# Patient Record
(demographics unavailable — no encounter records)

---

## 2024-10-15 NOTE — HISTORY OF PRESENT ILLNESS
[FreeTextEntry1] : Mr. Doran is an 83 y/o male with hx of HTN, MI (1986) s/p 2v CABG (2015), Lung cancer s/p Left and right VATS 12/2023 on immunotherapy, DMT2, and cholecystitis who presents to IR today for consultation for cholecystostomy tube and cholangiogram.  Pt was initially consulted on 8/8/24 & 8/14/24 with Dr La for spyglass cholangioscopy  - cystic duct stone extraction was unsuccessful. Stephany tube dislodged - had IR tube check on 10/9 - technically unable to be replace after multiple attempts. He is planned for Robotic assisted Laparoscopic cholecystectomy, intraoperative cholangiogram, possible open with Dr Zhang now that the pt has been cleared by his cardiologist.  Today, Mr Doran reports he will be having a consult with Dr Blum, stating that Dr Huertas referred him to this surgeon from [facility] - appt is on 10/18. Mr Doran denies fever, chills, abd pain, n/v/d.  I discussed that given he is asymptomatic, will hold off with stephany tube placement. Should he develop above s/s, he is to be taken to the ED / call 911.  He is currently booked with Dr Zhang at [place] on 10/25.    Cholecystitis Hx: Pt initially was hospitalized for cholecystitis and underwent IR percutaneous cholecystostomy tube placement on 04/05/2024. He was planned for Robotic assisted Laparoscopic cholecystectomy on 6/24/24 with Dr. Zhang.  He was advised to have cardiac clearance.  He had evaluation with his cardiologist, Dr. Huertas, but was advised to f/u with IR to determine if he is a candidate for spyglass cholangioscopy/lithotripsy in lieu of surgery.   Since drain insertion, he has not had a drain evaluation or exchange.  He has not been flushing drain and have been changing the dressing 1-2x/week.  Reports daily output of ~ 100ml. Denies fever, chills, abdominal pain.  Eating regular diet.   He completed chemotherapy for lung cancer and is now on immunotherapy.  Feels well, regaining some of the weight he lost while on chemo (previously 152lbs, now 165lbs)  Today he presents s/p stephany tube check on 8/12/24. Tube check demonstrated filling of a decompressed gallbladder cavity with persistent obstruction of the cystic duct. Pt returns today to discuss spyglass procedure. He reports continues feeling well.   Surg: Carlos Zhang - [place] Cardiac: Garry Huertas [phone] Surg: Dr Kerr - [facility] [0] : ~His/Her~ pain was 0 out of 10

## 2024-10-15 NOTE — ASSESSMENT
[FreeTextEntry1] : Mr. Diaz is an 85 y/o male with hx of HTN, MI (1986) s/p 2v CABG (2015), Lung cancer s/p Left and right VATS 12/2023 on immunotherapy, DMT2, and cholecystitis who presents to IR today for consultation for cholecystostomy tube and cholangiogram.  # Cholecystitis, Cholelithiasis - pt with admission for cholecystitis in 4/2024 s/p cholecystostomy tube - previously planned for robotic assisted Laparoscopic cholecystectomy on 6/24/24 - pt was initially consulted on 8/8/24 & 8/14/24 with Dr La for spyglass cholangioscopy  - cystic duct stone extraction was unsuccessful -  pt had stephany tube check on 10/9 - tube was removed & was unable to be replaced after multiple attempts - he was seen by Dr Zhang & is planned for Robotic assisted Laparoscopic cholecystectomy, intraoperative cholangiogram, possible open with Dr Zhang given the pt has been cleared by his cardiologist - booked for 10/25 - Mr Diaz reports he will be having a consult with Dr Blum stating that Dr Huertas referred him to this surgeon from [facility] - appt is on 10/18 - he denies fever, chills, abd pain, n/v/d - I discussed that given he is asymptomatic, will hold off with stephany tube placement - should he develop above s/s, he is to be taken to the ED / call 911 & call our dept & speak to the IR on call resident if after hours - he states that he is tentatively booked with Dr Zhang at [place] on 10/25   & Mrs. DIAZ's comprehension was confirmed & all questions were asked & answered to their satisfaction. IR contact information was reviewed with the pt should there be any questions, issues, or concerns, to be addressed.

## 2024-10-15 NOTE — HISTORY OF PRESENT ILLNESS
[de-identified] : JERRY DIAZ is a 84 year old male with PMHx of lung cancer on chemotherapy (last round 3/19), s/p R wedge resection in 2023, CAD s/p CABG in 2015 on ASA, DMT2 who presents in the office for preop visit for Laparoscopic cholecystectomy. Patient percutaneous cholecystostomy tube check 8/12 demonstrating filling of a decompressed gallbladder cavity with persistent obstruction of the cystic duct, he underwent a stephany tube check on 10/09/2024 tube was removed and unable to replace, multiple attempts made by IR. Patient called the office stated that his cardiologist cleared him for Laparoscopic cholecystectomy.  Today patient is doing well, offers no complaints, he will need medical and cardiology clearance prior the surgery.

## 2024-10-15 NOTE — ADDENDUM
[FreeTextEntry1] :  I spent 35  minutes reviewing the patient's chart, labs, imaging, interviewing and examining patient, and discussing plan of care with the patient, and other providers, excluding teaching and separately reported services and separately billable procedures.

## 2024-10-15 NOTE — DATA REVIEWED
[FreeTextEntry1] : ACC: [id number] EXAM: IR PROCEDURE NON PICC ORDERED BY: MARIA ISABEL FRANCISCO  PROCEDURE DATE: 09/12/2024  INTERPRETATION: Procedure: Cholangioscopy and cholecystostomy tube check/exchange.  Clinical Information: 84-year-old with history of cholecystitis status post cholecystostomy tube placement. Cholangioscopy is requested.  Technique: Informed consent obtained. The patient was placed in the supine position. The upper abdomen was prepped and draped in the usual sterile fashion. Timeout was performed. 1% lidocaine used for local anesthesia.  Contrast injection demonstrated gallbladder with obstructed cystic duct. There are no filling defects to suggest gallstones however there is presumption of large stone in the mid cystic duct. Cholecystostomy tube was then exchanged over wire for a 12 [place] Zealander sheath. Through the 12 [place] sheath a cholangioscope was placed. Multiple attempts are made to cannulate the cystic duct unsuccessfully. Multiple attempts are made with a catheter and wire to access the cystic duct which were unsuccessful. The sheath was removed and a new 10 [place] Zealander cholecystostomy tube catheter was placed.  Sedation: None.  Impression:  Unsuccessful cystic duct stone removal.  --- End of Report ---   MARIA ISABEL FRANCISCO MD; Attending Radiologist This document has been electronically signed. Sep 25 2024 1:36PM

## 2024-10-15 NOTE — ASSESSMENT
[FreeTextEntry1] : 84 year M with gallstones. Recommend laparoscopic cholecystectomy with intra-operative cholangiogram to avoid complications of gallstones. Risks, benefits, alternatives discussed. Risks discussed include: bleeding, bile duct injury, accidental injury to adjacent structures, cystic duct leak, need to do open procedure, need to do subtotal cholecystectomy, infection. All questions answered. He agrees to proceed.  We recommend Robotic assisted Laparoscopic cholecystectomy, intraoperative cholangiogram, possible open, he agrees to proceed.

## 2024-10-15 NOTE — PHYSICAL EXAM
[Normal Breath Sounds] : Normal breath sounds [Normal Heart Sounds] : normal heart sounds [Normal Rate and Rhythm] : normal rate and rhythm [No Rash or Lesion] : No rash or lesion [Alert] : alert [Oriented to Person] : oriented to person [Oriented to Place] : oriented to place [Oriented to Time] : oriented to time [Calm] : calm [de-identified] : The patient is alert, well-groomed  [de-identified] : Normoactive bowel sounds, soft and nontender, no hepatosplenomegaly or masses noted [de-identified] : full range of motion and no deformities appreciated.

## 2024-10-15 NOTE — HISTORY OF PRESENT ILLNESS
[FreeTextEntry1] : Mr. Doran is an 83 y/o male with hx of HTN, MI (1986) s/p 2v CABG (2015), Lung cancer s/p Left and right VATS 12/2023 on immunotherapy, DMT2, and cholecystitis who presents to IR today for consultation for cholecystostomy tube and cholangiogram.  Pt was initially consulted on 8/8/24 & 8/14/24 with Dr La for spyglass cholangioscopy  - cystic duct stone extraction was unsuccessful. Stephany tube dislodged - had IR tube check on 10/9 - technically unable to be replace after multiple attempts. He is planned for Robotic assisted Laparoscopic cholecystectomy, intraoperative cholangiogram, possible open with Dr Zhang now that the pt has been cleared by his cardiologist.  Today, Mr Doran reports he will be having a consult with Dr Blum, stating that Dr Huertas referred him to this surgeon from [facility] - appt is on 10/18. Mr Doran denies fever, chills, abd pain, n/v/d.  I discussed that given he is asymptomatic, will hold off with stephany tube placement. Should he develop above s/s, he is to be taken to the ED / call 911.  He is currently booked with Dr Zhang at [place] on 10/25.    Cholecystitis Hx: Pt initially was hospitalized for cholecystitis and underwent IR percutaneous cholecystostomy tube placement on 04/05/2024. He was planned for Robotic assisted Laparoscopic cholecystectomy on 6/24/24 with Dr. Zhang.  He was advised to have cardiac clearance.  He had evaluation with his cardiologist, Dr. Huertas, but was advised to f/u with IR to determine if he is a candidate for spyglass cholangioscopy/lithotripsy in lieu of surgery.   Since drain insertion, he has not had a drain evaluation or exchange.  He has not been flushing drain and have been changing the dressing 1-2x/week.  Reports daily output of ~ 100ml. Denies fever, chills, abdominal pain.  Eating regular diet.   He completed chemotherapy for lung cancer and is now on immunotherapy.  Feels well, regaining some of the weight he lost while on chemo (previously 152lbs, now 165lbs)  Today he presents s/p stephany tube check on 8/12/24. Tube check demonstrated filling of a decompressed gallbladder cavity with persistent obstruction of the cystic duct. Pt returns today to discuss spyglass procedure. He reports continues feeling well.   Surg: Carlos Zhang - [place] Cardiac: Garry Huertas [phone] Surg: Dr Kerr - [facility] [0] : ~His/Her~ pain was 0 out of 10

## 2024-10-15 NOTE — ASSESSMENT
[FreeTextEntry1] : Mr. Diaz is an 85 y/o male with hx of HTN, MI (1986) s/p 2v CABG (2015), Lung cancer s/p Left and right VATS 12/2023 on immunotherapy, DMT2, and cholecystitis who presents to IR today for consultation for cholecystostomy tube and cholangiogram.  # Cholecystitis, Cholelithiasis - pt with admission for cholecystitis in 4/2024 s/p cholecystostomy tube - previously planned for robotic assisted Laparoscopic cholecystectomy on 6/24/24 - pt was initially consulted on 8/8/24 & 8/14/24 with Dr La for spyglass cholangioscopy  - cystic duct stone extraction was unsuccessful -  pt had stephany tube check on 10/9 - tube was removed & was unable to be replaced after multiple attempts - he was seen by Dr Zhang & is planned for Robotic assisted Laparoscopic cholecystectomy, intraoperative cholangiogram, possible open with Dr Zhang given the pt has been cleared by his cardiologist - booked for 10/25 - Mr Diaz reports he will be having a consult with Dr Blum stating that Dr Huertas referred him to this surgeon from [facility] - appt is on 10/18 - he denies fever, chills, abd pain, n/v/d - I discussed that given he is asymptomatic, will hold off with stephany tube placement - should he develop above s/s, he is to be taken to the ED / call 911 & call our dept & speak to the IR on call resident if after hours - he states that he is tentatively booked with Dr Zhang at [place] on 10/25   & Mrs. DIAZ's comprehension was confirmed & all questions were asked & answered to their satisfaction. IR contact information was reviewed with the pt should there be any questions, issues, or concerns, to be addressed.

## 2024-10-15 NOTE — PHYSICAL EXAM
[Normal Breath Sounds] : Normal breath sounds [Normal Heart Sounds] : normal heart sounds [Normal Rate and Rhythm] : normal rate and rhythm [No Rash or Lesion] : No rash or lesion [Alert] : alert [Oriented to Person] : oriented to person [Oriented to Place] : oriented to place [Oriented to Time] : oriented to time [Calm] : calm [de-identified] : The patient is alert, well-groomed  [de-identified] : Normoactive bowel sounds, soft and nontender, no hepatosplenomegaly or masses noted [de-identified] : full range of motion and no deformities appreciated.

## 2024-10-15 NOTE — DATA REVIEWED
[FreeTextEntry1] : ACC: [id number] EXAM: IR PROCEDURE NON PICC ORDERED BY: MARIA ISABEL FRANCISCO  PROCEDURE DATE: 09/12/2024  INTERPRETATION: Procedure: Cholangioscopy and cholecystostomy tube check/exchange.  Clinical Information: 84-year-old with history of cholecystitis status post cholecystostomy tube placement. Cholangioscopy is requested.  Technique: Informed consent obtained. The patient was placed in the supine position. The upper abdomen was prepped and draped in the usual sterile fashion. Timeout was performed. 1% lidocaine used for local anesthesia.  Contrast injection demonstrated gallbladder with obstructed cystic duct. There are no filling defects to suggest gallstones however there is presumption of large stone in the mid cystic duct. Cholecystostomy tube was then exchanged over wire for a 12 Macedonian sheath. Through the 12 Macedonian sheath a cholangioscope was placed. Multiple attempts are made to cannulate the cystic duct unsuccessfully. Multiple attempts are made with a catheter and wire to access the cystic duct which were unsuccessful. The sheath was removed and a new 10 Macedonian cholecystostomy tube catheter was placed.  Sedation: None.  Impression:  Unsuccessful cystic duct stone removal.  --- End of Report ---   MARIA ISABEL FRANCISCO MD; Attending Radiologist This document has been electronically signed. Sep 25 2024 1:36PM

## 2024-10-15 NOTE — PLAN
[FreeTextEntry1] : Mr. JERRY DIAZ Patient was told significance of findings, options, risks and benefits were explained. Informed consent for Robotic assisted Laparoscopic cholecystectomy, intraoperative cholangiogram, possible open   and potential risks, benefits and alternatives (surgical options were discussed including non-surgical options or the option of no surgery) to the planned surgery were discussed in depth. All surgical options were discussed including non-surgical treatments. The patient wishes to proceed with surgery. We will plan for surgery on at the next available date, pending any required insurance pre-certification or pre-approval. Patient agrees to obtain any necessary pre-operative evaluations and testing prior to surgery. Patient advised to seek immediate medical attention with any acute change in symptoms or with the development of any new or worsening symptoms. Patient's questions and concerns addressed to patient's satisfaction, and patient verbalized an understanding of the information discussed.  Will schedule for the surgery at [facility].  [facility] Clear[facility]

## 2024-10-15 NOTE — PLAN
[FreeTextEntry1] : Mr. JERRY DIAZ Patient was told significance of findings, options, risks and benefits were explained. Informed consent for Robotic assisted Laparoscopic cholecystectomy, intraoperative cholangiogram, possible open   and potential risks, benefits and alternatives (surgical options were discussed including non-surgical options or the option of no surgery) to the planned surgery were discussed in depth. All surgical options were discussed including non-surgical treatments. The patient wishes to proceed with surgery. We will plan for surgery on at the next available date, pending any required insurance pre-certification or pre-approval. Patient agrees to obtain any necessary pre-operative evaluations and testing prior to surgery. Patient advised to seek immediate medical attention with any acute change in symptoms or with the development of any new or worsening symptoms. Patient's questions and concerns addressed to patient's satisfaction, and patient verbalized an understanding of the information discussed.  Will schedule for the surgery at [facility].  [facility] Clearance

## 2024-10-15 NOTE — HISTORY OF PRESENT ILLNESS
[de-identified] : JERRY DIAZ is a 84 year old male with PMHx of lung cancer on chemotherapy (last round 3/19), s/p R wedge resection in 2023, CAD s/p CABG in 2015 on ASA, DMT2 who presents in the office for preop visit for Laparoscopic cholecystectomy. Patient percutaneous cholecystostomy tube check 8/12 demonstrating filling of a decompressed gallbladder cavity with persistent obstruction of the cystic duct, he underwent a stephany tube check on 10/09/2024 tube was removed and unable to replace, multiple attempts made by IR. Patient called the office stated that his cardiologist cleared him for Laparoscopic cholecystectomy.  Today patient is doing well, offers no complaints, he will need medical and cardiology clearance prior the surgery.

## 2025-01-30 NOTE — PHYSICAL EXAM
[] : no respiratory distress [Auscultation Breath Sounds / Voice Sounds] : lungs were clear to auscultation bilaterally [Heart Rate And Rhythm] : heart rate was normal and rhythm regular [Heart Sounds] : normal S1 and S2 [Examination Of The Chest] : the chest was normal in appearance [Chest Visual Inspection Thoracic Asymmetry] : no chest asymmetry [Diminished Respiratory Excursion] : normal chest expansion

## 2025-01-31 NOTE — ASSESSMENT
[FreeTextEntry1] : Mr. JERRY DIAZ, 85 year old male, former heavy smoker, w/ hx of HTN, MI in 1986, CABG (Lt internal mammary artery to Lt anterior descending artery) on 3/6/2015 by Dr. Jonny Alex, who presented to PCP for annual physical exam, sent for CXR due to tobacco use, found lung nodule.  Now 3 yrs 9mo s/p Lt VATS Robotic-assisted, MAURICIO apicoposterior segmentectomy, MLND on 10/7/2020. Path revealed AdenoCA, solid predominant, 1.5cm, G3, +WILLIAM, margins and (0/10) LNs negative, pT1bN0 Stg IA2.  Now >4 yrs s/p Right VATS, Robotic-assisted, wedge resection of RLL nodule, wedge resection of RUL nodule, MLND on 11/30/2020. 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 Path of RUL wedge resection revealed invasive papillary carcinoma, G2, + WILLIAM, 0.9 cm, pT1a(m)N0, Stage IA1. Two primary tumors.  Now 1 yr s/p Right VATS, robotic-assisted. Wedge resection of right middle lobe lung nodule.  Right middle lobectomy.  Mediastinal lymph node dissection on 12/11/23. Path revealed RML invasive papillary AdenoCA, 1.1 cm, G2, + Lvl 11R (1/6), all margins are negative, [id number], Stg IIB **IHC in the tumor (TTF1 positive and PAX8 negative) and lymph node (TTF1 positive and CDX2 negative) support the diagnosis of a primary lung adenocarcinoma with metastasis to the lymph node. ***The current tumor was compared to the right upper lobe tumor removed in 2020 ([id number]). Although both tumors are papillary predominant the histomorphology of both tumors are different and therefore considered separate primary tumors  Patient to f/u with Hem/Onc Dr. Aviles for chemo 8 cycles of chemo in June 2024 completed in May 2024, now on immunotherapy for 1 yr.   RUL increased in size, however, pt is on immunotherapy now, and had 3 surgeries in the past, Will continue observation. RTC in 6 mons with CT Chest w/o contrast  CT Chest on 1/21/25: - post-op changes - redemonstration of left paramediastinal atelectasis and airway associated groundglass opacities in left upper lobe - there are tubular branching and cluster of nodules in bilateral lungs, likely impacted airways (for instance in right lower lobe on series 4, image 619).  - there is an ill-defined 2.2 cm focus of groundglass opacity in right upper lobe, relatively stable - there are additional smaller foci of groundglass opacities in right upper lobe as follow: 7 mm focus of groundglass opacity in right upper lobe on series 4, image 485, with slight increase in size (previously measured 6 mm) 7 mm focus on groundglass opacity in right upper lobe on series 4, image 208, unchanged - A 1.8 cm right renal cyst is noted - there is 4 mm focus of a sclerosis in T7 vertebral body, unchanged and likely a bony island  I have reviewed the patient's medical records and diagnostic images at time of this office consultation and have made the following recommendation: 1. CT chest and PET scans reviewed and explained to patient. Results showed slow growing lesions relatively stable. I recommend continuing to monitor with CT Chest no contrast in 6 months. Patient in agreement. I personally spoke to Dr Aviles (Onc), will defer to oncology for further plan in regards to immunotherapy.  I, BILL Ramos, personally performed the evaluation and management (E/M) services for this established patient who presents today with (a) new problem(s)/exacerbation of (an) existing condition(s).  That E/M includes conducting the examination, assessing all new/exacerbated conditions, and establishing a new plan of care.  Today, my ACP, Racquel Escalante, KEDAR-BC was here to observe my evaluation and management services for this new problem/exacerbated condition to be followed going forward.

## 2025-01-31 NOTE — CONSULT LETTER
[Dear  ___] : Dear  [unfilled], [Consult Letter:] : I had the pleasure of evaluating your patient, [unfilled]. [( Thank you for referring [unfilled] for consultation for _____ )] : Thank you for referring [unfilled] for consultation for [unfilled] [Please see my note below.] : Please see my note below. [Consult Closing:] : Thank you very much for allowing me to participate in the care of this patient.  If you have any questions, please do not hesitate to contact me. [Sincerely,] : Sincerely, [DrJaya  ___] : Dr. CHAPARRO [DrJaya ___] : Dr. CHAPARRO [FreeTextEntry2] : Garry Davis MD (Cardiologist/Referring) Valeriy Garcia MD (Pulm) Gisel Hall MD (PCP) Efrain Clemente MD (GI)   [FreeTextEntry3] : Miky Ogden MD, MPH \par  System Director of Thoracic Surgery \par  Director of Comprehensive Lung and Foregut [place] \par  Professor Cardiovascular & Thoracic Surgery  \par  [facility] [employer] at [facility]\par

## 2025-01-31 NOTE — DATA REVIEWED
[FreeTextEntry1] : I have independently reviewed the following: CT Chest on 1/21/25: PET/CT 10/30/2024:

## 2025-01-31 NOTE — CONSULT LETTER
[Dear  ___] : Dear  [unfilled], [( Thank you for referring [unfilled] for consultation for _____ )] : Thank you for referring [unfilled] for consultation for [unfilled] [Consult Letter:] : I had the pleasure of evaluating your patient, [unfilled]. [Please see my note below.] : Please see my note below. [Consult Closing:] : Thank you very much for allowing me to participate in the care of this patient.  If you have any questions, please do not hesitate to contact me. [Sincerely,] : Sincerely, [DrJaya  ___] : Dr. CHAPARRO [DrJaya ___] : Dr. CHAPARRO [FreeTextEntry2] : Garry Davis MD (Cardiologist/Referring) Valeriy Garcia MD (Pulm) Gisel Hall MD (PCP) Efrain Clemente MD (GI)   [FreeTextEntry3] : Miky Ogden MD, MPH \par  System Director of Thoracic Surgery \par  Director of Comprehensive Lung and Foregut [place] \par  Professor Cardiovascular & Thoracic Surgery  \par  [facility] [employer] at [facility]\par

## 2025-01-31 NOTE — CONSULT LETTER
[Dear  ___] : Dear  [unfilled], [Consult Letter:] : I had the pleasure of evaluating your patient, [unfilled]. [( Thank you for referring [unfilled] for consultation for _____ )] : Thank you for referring [unfilled] for consultation for [unfilled] [Please see my note below.] : Please see my note below. [Consult Closing:] : Thank you very much for allowing me to participate in the care of this patient.  If you have any questions, please do not hesitate to contact me. [Sincerely,] : Sincerely, [DrJaya  ___] : Dr. CHAPARRO [DrJaya ___] : Dr. CHAPARRO [FreeTextEntry2] : Garry Davis MD (Cardiologist/Referring) Valeriy Garcia MD (Pulm) Gisel Hall MD (PCP) Efrain Clemente MD (GI)   [FreeTextEntry3] : Miky Ogden MD, MPH \par  System Director of Thoracic Surgery \par  Director of Comprehensive Lung and Foregut [place] \par  Professor Cardiovascular & Thoracic Surgery  \par  [facility] [employer] at [facility]\par

## 2025-01-31 NOTE — HISTORY OF PRESENT ILLNESS
[FreeTextEntry1] : Mr. JERRY DIAZ, 85 year old male, former heavy smoker, w/ hx of HTN, MI in 1986, CABG (Lt internal mammary artery to Lt anterior descending artery) on 3/6/2015 by Dr. Jonny Alex, who presented to PCP for annual physical exam, sent for CXR due to tobacco use, found lung nodule.  EGD on 7/27/2020 by Dr. Efrain Clemente showed normal esophagus; no Victor's. Path of esophagus at 39cm and 40cm showed reflux-type esophagitis, negative for Victor's, negative for dysplasia. Path of gastric cardia showed chronic nonspecific gastritis, negative H. Pylori, negative for intestinal metaplasia.  FNA of 1.5cm Lt lung nodule on 8/14/2020 at [facility], path revealed NSCLC w/ glandular differentiation (AdenoCA w/ acinar pattern).  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  Now 3 yrs 9mo s/p Lt VATS Robotic-assisted, MAURICIO apicoposterior segmentectomy, MLND on 10/7/2020. Path revealed AdenoCA, solid predominant, 1.5cm, G3, +WILLIAM, margins and (0/10) LNs negative, pT1bN0 Stg IA2.  PFTs on 10/30/2020: %, FEV1 111%, DLCO 75%.  CT chest on 11/16/2020: - a 1.5 cm irregular RLL nodule (3: 112), with maximal measurement of 1.8 cm on coronal imaging - abutting the major fissure is a 6 mm nodule (3: 91) which most likely represents an intrapulmonary LN. - 6 mm RUL solid pulmonary nodule (3: 40) - post-op changes - mild bilateral lower lobe bronchiectasis.  Now >4 yrs s/p Right VATS, Robotic-assisted, wedge resection of RLL nodule, wedge resection of RUL nodule, MLND on 11/30/2020. 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 Path of RUL wedge resection revealed invasive papillary carcinoma, G2, + WILLIAM, 0.9 cm, pT1a(m)N0, Stage IA1. Two primary tumors.  CT Chest on 6/7/21: - new nodular opacities in the LLL measuring up to 9mm (4:101) - new nodules in the MAURICIO near surgical suture margin measuring up to 6mm (4:87) - increasing size 6mm RUL ggo (4:80; previously 4mm) - stable 0.6cm perifissural solid RUL nodule (4:115) - MAURICIO mucoid impaction  PET/CT on 6/17/21: - post-op changes - linear opacity adjacent to the MAURICIO suture material with adjacent nodularity 1.7 cm on PET with SUV=3 - subcentimeter nodules anterior to this linear opacity up to 5 mm - small stable 5 mm in RML - cluster nodules in LLL 5 mm, SUV=3.1 - moderate colonic diverticulosis with no evidence of diverticulitis  **Last seen in office June 2021.  CT Chest on 10/23/23 at [facility]: - bilateral post-op changes - a 12 x 11 mm RML spiculated solid pulmonary nodule (3: 126; previously 6 mm) and appearing subsolid on 6/7/2021, highly suspicious for primary bronchogenic neoplasm. - Vague groundglass opacity posterior RUL (3: 73), is overall unchanged measuring 14 mm. - Additional subcentimeter groundglass nodules are present in the RUL, unchanged. - Few additional punctate 3 mm pulmonary nodules posterior RUL on image 78, series 3 are present new from prior exam. - There is a 3 mm RUL nodule on image 85, series 3.  PFT 11/15/23: FVC 94%; FEV1 84%; DLCO 66%  MRI head w/w/o IV cont on 12/5/23:  - Minimal white matter ischemic changes. - No abnormal intracranial enhancement to indicate metastasis.  PET/CT 12/4/23: - Nonspecific small focus of mild FDG activity in right perihilar region, likely corresponding to a small lymph node, is unchanged (SUV 3.0; image 87; previous SUV 3.5). - S/p wedge resections of the RUL; RLL and MAURICIO. Resolution of FDG activity associated with linear opacity along MAURICIO suture line which is unchanged on CT (image 93). - A minimally FDG avid RML posterior segment nodule measures 1.0 x 0.8 cm, SUV 1.4 (image 100), previously 1.2 x 1.0 cm in CT chest 10/23/2023, and 0.7 x 0.5 cm on prior PET/CT from 1/17/21.This is concerning for a low-grade primary lung neoplasm. - A cluster of FDG avid ggn in the RUL just anterior to the wedge resection suture material are new as compared to prior PET/CT, and have increased in number since CT of the chest 10/23/2023, where an ill-defined ggo was seen. For example in this region, there is an FDG avid nodule measuring 1.6 x 1.2 cm, SUV 2.8 (image 83). Findings likely are secondary to an infectious/inflammatory etiology. Please correlate clinically and follow-up with dedicated CT of chest in 1-2 months to assess for resolution.  - Resolution of mildly FDG-avid cluster of LLL pulmonary nodule seen on prior PET/CT.  Now 1 yr s/p Right VATS, robotic-assisted. Wedge resection of right middle lobe lung nodule.  Right middle lobectomy.  Mediastinal lymph node dissection on 12/11/23. Path revealed RML invasive papillary AdenoCA, 1.1 cm, G2, + Lvl 11R (1/6), all margins are negative, [id number], Stg IIB **IHC in the tumor (TTF1 positive and PAX8 negative) and lymph node (TTF1 positive and CDX2 negative) support the diagnosis of a primary lung adenocarcinoma with metastasis to the lymph node. ***The current tumor was compared to the right upper lobe tumor removed in 2020 ([id number]). Although both tumors are papillary predominant the histomorphology of both tumors are different and therefore considered separate primary tumors  Patient to f/u with Hem/Onc Dr. Aviles for chemo 8 cycles of chemo in June 2024, completed in May 2024, now on immunotherapy for 1 yr.   S/p drainage for cholelithiasis on 4/5/2024, plan for surgery post chemotherapy.   CT chest on 7/11/24: - post-op changes - New tree-in-bud and other punctate clustered nodules in both lungs.  - Unchanged right upper lobe 5 mm solid nodule (2-72).  - New sub-3 mm solid nodule within an otherwise unchanged vague approximate 2 cm groundglass nodule in the right upper lobe (2-45).  - Increased size of 2 adjacent right upper lobe groundglass nodules up to 1.2 cm (2:29-31), prior up to 1 cm.  - Unchanged groundglass nodule in the posterior segment of the right upper lobe (2-64). - No pleural effusion. No lymphadenopathy. Calcified subcarinal lymph node, likely prior granulomatous disease.  RUL increased in size, however, pt is on immunotherapy now, and had 3 surgeries in the past, Will continue observation. RTC in 6 mons with CT Chest w/o contrast  PET/CT 10/30/2024:  - Nonspecific mild activity, (SUV 3) corresponding to 2 new adjacent left upper lung nodules measuring 6 and 5 mm, images 68 and 69. This is medial to the wedge resection site, new from 7/11/2024 chest CT. Mild appearing adjacent micronodules, not avid.   - On image 78 in the medial left upper lung, adjacent to the medial margin of the wedge resection is a small moderately avid focus, SUV 4, poorly delineated on CT.   - Right middle lobectomy. Multiple wedge resections in both lungs.  - Nonavid stable 5 mm right upper lobe solid nodule, image 91;  - Interval decreased size of 9 mm previously 1.2 cm nonavid groundglass opacity, image 66; resolution of the previously noted 3 mm nodule associated with right upper lung ground glass opacity, area is non-avid;  - Motion degradation in the area of the groundglass opacity posterior segment right upper lobe, on image 87, non-FDG avid.  - Nonavid 7 cm exophytic simple left renal cyst.  - Small hiatal hernia. Tiny fat-containing umbilical hernia. Small fat-containing right inguinal hernia.   CT Chest on 1/21/25: - post-op changes - redemonstration of left paramediastinal atelectasis and airway associated groundglass opacities in left upper lobe - there are tubular branching and cluster of nodules in bilateral lungs, likely impacted airways (for instance in right lower lobe on series 4, image 619).  - there is an ill-defined 2.2 cm focus of groundglass opacity in right upper lobe, relatively stable - there are additional smaller foci of groundglass opacities in right upper lobe as follow: 7 mm focus of groundglass opacity in right upper lobe on series 4, image 485, with slight increase in size (previously measured 6 mm) 7 mm focus on groundglass opacity in right upper lobe on series 4, image 208, unchanged - A 1.8 cm right renal cyst is noted - there is 4 mm focus of a sclerosis in T7 vertebral body, unchanged and likely a bony island  Pt presents today for 6 month follow up. Patient denies cough, SOB, pain or difficulty in breathing.

## 2025-01-31 NOTE — HISTORY OF PRESENT ILLNESS
[FreeTextEntry1] : Mr. JERRY DIAZ, 85 year old male, former heavy smoker, w/ hx of HTN, MI in 1986, CABG (Lt internal mammary artery to Lt anterior descending artery) on 3/6/2015 by Dr. Jonny Alex, who presented to PCP for annual physical exam, sent for CXR due to tobacco use, found lung nodule.  EGD on 7/27/2020 by Dr. Efrain Clemente showed normal esophagus; no Victor's. Path of esophagus at 39cm and 40cm showed reflux-type esophagitis, negative for Victor's, negative for dysplasia. Path of gastric cardia showed chronic nonspecific gastritis, negative H. Pylori, negative for intestinal metaplasia.  FNA of 1.5cm Lt lung nodule on 8/14/2020 at [facility], path revealed NSCLC w/ glandular differentiation (AdenoCA w/ acinar pattern).  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  Now 3 yrs 9mo s/p Lt VATS Robotic-assisted, MAURICIO apicoposterior segmentectomy, MLND on 10/7/2020. Path revealed AdenoCA, solid predominant, 1.5cm, G3, +WILLIAM, margins and (0/10) LNs negative, pT1bN0 Stg IA2.  PFTs on 10/30/2020: %, FEV1 111%, DLCO 75%.  CT chest on 11/16/2020: - a 1.5 cm irregular RLL nodule (3: 112), with maximal measurement of 1.8 cm on coronal imaging - abutting the major fissure is a 6 mm nodule (3: 91) which most likely represents an intrapulmonary LN. - 6 mm RUL solid pulmonary nodule (3: 40) - post-op changes - mild bilateral lower lobe bronchiectasis.  Now >4 yrs s/p Right VATS, Robotic-assisted, wedge resection of RLL nodule, wedge resection of RUL nodule, MLND on 11/30/2020. 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 Path of RUL wedge resection revealed invasive papillary carcinoma, G2, + WILLIAM, 0.9 cm, pT1a(m)N0, Stage IA1. Two primary tumors.  CT Chest on 6/7/21: - new nodular opacities in the LLL measuring up to 9mm (4:101) - new nodules in the MAURICIO near surgical suture margin measuring up to 6mm (4:87) - increasing size 6mm RUL ggo (4:80; previously 4mm) - stable 0.6cm perifissural solid RUL nodule (4:115) - MAURICIO mucoid impaction  PET/CT on 6/17/21: - post-op changes - linear opacity adjacent to the MAURICIO suture material with adjacent nodularity 1.7 cm on PET with SUV=3 - subcentimeter nodules anterior to this linear opacity up to 5 mm - small stable 5 mm in RML - cluster nodules in LLL 5 mm, SUV=3.1 - moderate colonic diverticulosis with no evidence of diverticulitis  **Last seen in office June 2021.  CT Chest on 10/23/23 at [facility]: - bilateral post-op changes - a 12 x 11 mm RML spiculated solid pulmonary nodule (3: 126; previously 6 mm) and appearing subsolid on 6/7/2021, highly suspicious for primary bronchogenic neoplasm. - Vague groundglass opacity posterior RUL (3: 73), is overall unchanged measuring 14 mm. - Additional subcentimeter groundglass nodules are present in the RUL, unchanged. - Few additional punctate 3 mm pulmonary nodules posterior RUL on image 78, series 3 are present new from prior exam. - There is a 3 mm RUL nodule on image 85, series 3.  PFT 11/15/23: FVC 94%; FEV1 84%; DLCO 66%  MRI head w/w/o IV cont on 12/5/23:  - Minimal white matter ischemic changes. - No abnormal intracranial enhancement to indicate metastasis.  PET/CT 12/4/23: - Nonspecific small focus of mild FDG activity in right perihilar region, likely corresponding to a small lymph node, is unchanged (SUV 3.0; image 87; previous SUV 3.5). - S/p wedge resections of the RUL; RLL and MAURICIO. Resolution of FDG activity associated with linear opacity along MAURICIO suture line which is unchanged on CT (image 93). - A minimally FDG avid RML posterior segment nodule measures 1.0 x 0.8 cm, SUV 1.4 (image 100), previously 1.2 x 1.0 cm in CT chest 10/23/2023, and 0.7 x 0.5 cm on prior PET/CT from 1/17/21.This is concerning for a low-grade primary lung neoplasm. - A cluster of FDG avid ggn in the RUL just anterior to the wedge resection suture material are new as compared to prior PET/CT, and have increased in number since CT of the chest 10/23/2023, where an ill-defined ggo was seen. For example in this region, there is an FDG avid nodule measuring 1.6 x 1.2 cm, SUV 2.8 (image 83). Findings likely are secondary to an infectious/inflammatory etiology. Please correlate clinically and follow-up with dedicated CT of chest in 1-2 months to assess for resolution.  - Resolution of mildly FDG-avid cluster of LLL pulmonary nodule seen on prior PET/CT.  Now 1 yr s/p Right VATS, robotic-assisted. Wedge resection of right middle lobe lung nodule.  Right middle lobectomy.  Mediastinal lymph node dissection on 12/11/23. Path revealed RML invasive papillary AdenoCA, 1.1 cm, G2, + Lvl 11R (1/6), all margins are negative, [id number], Stg IIB **IHC in the tumor (TTF1 positive and PAX8 negative) and lymph node (TTF1 positive and CDX2 negative) support the diagnosis of a primary lung adenocarcinoma with metastasis to the lymph node. ***The current tumor was compared to the right upper lobe tumor removed in 2020 ([id number]). Although both tumors are papillary predominant the histomorphology of both tumors are different and therefore considered separate primary tumors  Patient to f/u with Hem/Onc Dr. Aviles for chemo 8 cycles of chemo in June 2024, completed in May 2024, now on immunotherapy for 1 yr.   S/p drainage for cholelithiasis on 4/5/2024, plan for surgery post chemotherapy.   CT chest on 7/11/24: - post-op changes - New tree-in-bud and other punctate clustered nodules in both lungs.  - Unchanged right upper lobe 5 mm solid nodule (2-72).  - New sub-3 mm solid nodule within an otherwise unchanged vague approximate 2 cm groundglass nodule in the right upper lobe (2-45).  - Increased size of 2 adjacent right upper lobe groundglass nodules up to 1.2 cm (2:29-31), prior up to 1 cm.  - Unchanged groundglass nodule in the posterior segment of the right upper lobe (2-64). - No pleural effusion. No lymphadenopathy. Calcified subcarinal lymph node, likely prior granulomatous disease.  RUL increased in size, however, pt is on immunotherapy now, and had 3 surgeries in the past, Will continue observation. RTC in 6 mons with CT Chest w/o contrast  PET/CT 10/30/2024:  - Nonspecific mild activity, (SUV 3) corresponding to 2 new adjacent left upper lung nodules measuring 6 and 5 mm, images 68 and 69. This is medial to the wedge resection site, new from 7/11/2024 chest CT. Mild appearing adjacent micronodules, not avid.   - On image 78 in the medial left upper lung, adjacent to the medial margin of the wedge resection is a small moderately avid focus, SUV 4, poorly delineated on CT.   - Right middle lobectomy. Multiple wedge resections in both lungs.  - Nonavid stable 5 mm right upper lobe solid nodule, image 91;  - Interval decreased size of 9 mm previously 1.2 cm nonavid groundglass opacity, image 66; resolution of the previously noted 3 mm nodule associated with right upper lung ground glass opacity, area is non-avid;  - Motion degradation in the area of the groundglass opacity posterior segment right upper lobe, on image 87, non-FDG avid.  - Nonavid 7 cm exophytic simple left renal cyst.  - Small hiatal hernia. Tiny fat-containing umbilical hernia. Small fat-containing right inguinal hernia.   CT Chest on 1/21/25: - post-op changes - redemonstration of left paramediastinal atelectasis and airway associated groundglass opacities in left upper lobe - there are tubular branching and cluster of nodules in bilateral lungs, likely impacted airways (for instance in right lower lobe on series 4, image 619).  - there is an ill-defined 2.2 cm focus of groundglass opacity in right upper lobe, relatively stable - there are additional smaller foci of groundglass opacities in right upper lobe as follow: 7 mm focus of groundglass opacity in right upper lobe on series 4, image 485, with slight increase in size (previously measured 6 mm) 7 mm focus on groundglass opacity in right upper lobe on series 4, image 208, unchanged - A 1.8 cm right renal cyst is noted - there is 4 mm focus of a sclerosis in T7 vertebral body, unchanged and likely a bony island  Pt presents today for 6 month follow up. Patient denies cough, SOB, pain or difficulty in breathing.

## 2025-01-31 NOTE — HISTORY OF PRESENT ILLNESS
[FreeTextEntry1] : Mr. JERRY DIAZ, 85 year old male, former heavy smoker, w/ hx of HTN, MI in 1986, CABG (Lt internal mammary artery to Lt anterior descending artery) on 3/6/2015 by Dr. Jonny Alex, who presented to PCP for annual physical exam, sent for CXR due to tobacco use, found lung nodule.  EGD on 7/27/2020 by Dr. Efrain Clemente showed normal esophagus; no Victor's. Path of esophagus at 39cm and 40cm showed reflux-type esophagitis, negative for Victor's, negative for dysplasia. Path of gastric cardia showed chronic nonspecific gastritis, negative H. Pylori, negative for intestinal metaplasia.  FNA of 1.5cm Lt lung nodule on 8/14/2020 at [facility], path revealed NSCLC w/ glandular differentiation (AdenoCA w/ acinar pattern).  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  Now 3 yrs 9mo s/p Lt VATS Robotic-assisted, MAURICIO apicoposterior segmentectomy, MLND on 10/7/2020. Path revealed AdenoCA, solid predominant, 1.5cm, G3, +WILLIAM, margins and (0/10) LNs negative, pT1bN0 Stg IA2.  PFTs on 10/30/2020: %, FEV1 111%, DLCO 75%.  CT chest on 11/16/2020: - a 1.5 cm irregular RLL nodule (3: 112), with maximal measurement of 1.8 cm on coronal imaging - abutting the major fissure is a 6 mm nodule (3: 91) which most likely represents an intrapulmonary LN. - 6 mm RUL solid pulmonary nodule (3: 40) - post-op changes - mild bilateral lower lobe bronchiectasis.  Now >4 yrs s/p Right VATS, Robotic-assisted, wedge resection of RLL nodule, wedge resection of RUL nodule, MLND on 11/30/2020. 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 Path of RUL wedge resection revealed invasive papillary carcinoma, G2, + WILLIAM, 0.9 cm, pT1a(m)N0, Stage IA1. Two primary tumors.  CT Chest on 6/7/21: - new nodular opacities in the LLL measuring up to 9mm (4:101) - new nodules in the MAURICIO near surgical suture margin measuring up to 6mm (4:87) - increasing size 6mm RUL ggo (4:80; previously 4mm) - stable 0.6cm perifissural solid RUL nodule (4:115) - MAURICIO mucoid impaction  PET/CT on 6/17/21: - post-op changes - linear opacity adjacent to the MAURICIO suture material with adjacent nodularity 1.7 cm on PET with SUV=3 - subcentimeter nodules anterior to this linear opacity up to 5 mm - small stable 5 mm in RML - cluster nodules in LLL 5 mm, SUV=3.1 - moderate colonic diverticulosis with no evidence of diverticulitis  **Last seen in office June 2021.  CT Chest on 10/23/23 at [facility]: - bilateral post-op changes - a 12 x 11 mm RML spiculated solid pulmonary nodule (3: 126; previously 6 mm) and appearing subsolid on 6/7/2021, highly suspicious for primary bronchogenic neoplasm. - Vague groundglass opacity posterior RUL (3: 73), is overall unchanged measuring 14 mm. - Additional subcentimeter groundglass nodules are present in the RUL, unchanged. - Few additional punctate 3 mm pulmonary nodules posterior RUL on image 78, series 3 are present new from prior exam. - There is a 3 mm RUL nodule on image 85, series 3.  PFT 11/15/23: FVC 94%; FEV1 84%; DLCO 66%  MRI head w/w/o IV cont on 12/5/23:  - Minimal white matter ischemic changes. - No abnormal intracranial enhancement to indicate metastasis.  PET/CT 12/4/23: - Nonspecific small focus of mild FDG activity in right perihilar region, likely corresponding to a small lymph node, is unchanged (SUV 3.0; image 87; previous SUV 3.5). - S/p wedge resections of the RUL; RLL and MAURICIO. Resolution of FDG activity associated with linear opacity along MAURICIO suture line which is unchanged on CT (image 93). - A minimally FDG avid RML posterior segment nodule measures 1.0 x 0.8 cm, SUV 1.4 (image 100), previously 1.2 x 1.0 cm in CT chest 10/23/2023, and 0.7 x 0.5 cm on prior PET/CT from 1/17/21.This is concerning for a low-grade primary lung neoplasm. - A cluster of FDG avid ggn in the RUL just anterior to the wedge resection suture material are new as compared to prior PET/CT, and have increased in number since CT of the chest 10/23/2023, where an ill-defined ggo was seen. For example in this region, there is an FDG avid nodule measuring 1.6 x 1.2 cm, SUV 2.8 (image 83). Findings likely are secondary to an infectious/inflammatory etiology. Please correlate clinically and follow-up with dedicated CT of chest in 1-2 months to assess for resolution.  - Resolution of mildly FDG-avid cluster of LLL pulmonary nodule seen on prior PET/CT.  Now 1 yr s/p Right VATS, robotic-assisted. Wedge resection of right middle lobe lung nodule.  Right middle lobectomy.  Mediastinal lymph node dissection on 12/11/23. Path revealed RML invasive papillary AdenoCA, 1.1 cm, G2, + Lvl 11R (1/6), all margins are negative, [id number], Stg IIB **IHC in the tumor (TTF1 positive and PAX8 negative) and lymph node (TTF1 positive and CDX2 negative) support the diagnosis of a primary lung adenocarcinoma with metastasis to the lymph node. ***The current tumor was compared to the right upper lobe tumor removed in 2020 ([id number]). Although both tumors are papillary predominant the histomorphology of both tumors are different and therefore considered separate primary tumors  Patient to f/u with Hem/Onc Dr. Aviles for chemo 8 cycles of chemo in June 2024, completed in May 2024, now on immunotherapy for 1 yr.   S/p drainage for cholelithiasis on 4/5/2024, plan for surgery post chemotherapy.   CT chest on 7/11/24: - post-op changes - New tree-in-bud and other punctate clustered nodules in both lungs.  - Unchanged right upper lobe 5 mm solid nodule (2-72).  - New sub-3 mm solid nodule within an otherwise unchanged vague approximate 2 cm groundglass nodule in the right upper lobe (2-45).  - Increased size of 2 adjacent right upper lobe groundglass nodules up to 1.2 cm (2:29-31), prior up to 1 cm.  - Unchanged groundglass nodule in the posterior segment of the right upper lobe (2-64). - No pleural effusion. No lymphadenopathy. Calcified subcarinal lymph node, likely prior granulomatous disease.  RUL increased in size, however, pt is on immunotherapy now, and had 3 surgeries in the past, Will continue observation. RTC in 6 mons with CT Chest w/o contrast  PET/CT 10/30/2024:  - Nonspecific mild activity, (SUV 3) corresponding to 2 new adjacent left upper lung nodules measuring 6 and 5 mm, images 68 and 69. This is medial to the wedge resection site, new from 7/11/2024 chest CT. Mild appearing adjacent micronodules, not avid.   - On image 78 in the medial left upper lung, adjacent to the medial margin of the wedge resection is a small moderately avid focus, SUV 4, poorly delineated on CT.   - Right middle lobectomy. Multiple wedge resections in both lungs.  - Nonavid stable 5 mm right upper lobe solid nodule, image 91;  - Interval decreased size of 9 mm previously 1.2 cm nonavid groundglass opacity, image 66; resolution of the previously noted 3 mm nodule associated with right upper lung ground glass opacity, area is non-avid;  - Motion degradation in the area of the groundglass opacity posterior segment right upper lobe, on image 87, non-FDG avid.  - Nonavid 7 cm exophytic simple left renal cyst.  - Small hiatal hernia. Tiny fat-containing umbilical hernia. Small fat-containing right inguinal hernia.   CT Chest on 1/21/25: - post-op changes - redemonstration of left paramediastinal atelectasis and airway associated groundglass opacities in left upper lobe - there are tubular branching and cluster of nodules in bilateral lungs, likely impacted airways (for instance in right lower lobe on series 4, image 619).  - there is an ill-defined 2.2 cm focus of groundglass opacity in right upper lobe, relatively stable - there are additional smaller foci of groundglass opacities in right upper lobe as follow: 7 mm focus of groundglass opacity in right upper lobe on series 4, image 485, with slight increase in size (previously measured 6 mm) 7 mm focus on groundglass opacity in right upper lobe on series 4, image 208, unchanged - A 1.8 cm right renal cyst is noted - there is 4 mm focus of a sclerosis in T7 vertebral body, unchanged and likely a bony island  Pt presents today for 6 month follow up. Patient denies cough, SOB, pain or difficulty in breathing.

## 2025-01-31 NOTE — ASSESSMENT
[FreeTextEntry1] : Mr. JERRY DIAZ, 85 year old male, former heavy smoker, w/ hx of HTN, MI in 1986, CABG (Lt internal mammary artery to Lt anterior descending artery) on 3/6/2015 by Dr. Jonny Alex, who presented to PCP for annual physical exam, sent for CXR due to tobacco use, found lung nodule.  Now 3 yrs 9mo s/p Lt VATS Robotic-assisted, MAURICIO apicoposterior segmentectomy, MLND on 10/7/2020. Path revealed AdenoCA, solid predominant, 1.5cm, G3, +WILLIAM, margins and (0/10) LNs negative, pT1bN0 Stg IA2.  Now >4 yrs s/p Right VATS, Robotic-assisted, wedge resection of RLL nodule, wedge resection of RUL nodule, MLND on 11/30/2020. 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 Path of RUL wedge resection revealed invasive papillary carcinoma, G2, + WILLIAM, 0.9 cm, pT1a(m)N0, Stage IA1. Two primary tumors.  Now 1 yr s/p Right VATS, robotic-assisted. Wedge resection of right middle lobe lung nodule.  Right middle lobectomy.  Mediastinal lymph node dissection on 12/11/23. Path revealed RML invasive papillary AdenoCA, 1.1 cm, G2, + Lvl 11R (1/6), all margins are negative, [id number], Stg IIB **IHC in the tumor (TTF1 positive and PAX8 negative) and lymph node (TTF1 positive and CDX2 negative) support the diagnosis of a primary lung adenocarcinoma with metastasis to the lymph node. ***The current tumor was compared to the right upper lobe tumor removed in 2020 ([id number]). Although both tumors are papillary predominant the histomorphology of both tumors are different and therefore considered separate primary tumors  Patient to f/u with Hem/Onc Dr. Aviles for chemo 8 cycles of chemo in June 2024 completed in May 2024, now on immunotherapy for 1 yr.   RUL increased in size, however, pt is on immunotherapy now, and had 3 surgeries in the past, Will continue observation. RTC in 6 mons with CT Chest w/o contrast  CT Chest on 1/21/25: - post-op changes - redemonstration of left paramediastinal atelectasis and airway associated groundglass opacities in left upper lobe - there are tubular branching and cluster of nodules in bilateral lungs, likely impacted airways (for instance in right lower lobe on series 4, image 619).  - there is an ill-defined 2.2 cm focus of groundglass opacity in right upper lobe, relatively stable - there are additional smaller foci of groundglass opacities in right upper lobe as follow: 7 mm focus of groundglass opacity in right upper lobe on series 4, image 485, with slight increase in size (previously measured 6 mm) 7 mm focus on groundglass opacity in right upper lobe on series 4, image 208, unchanged - A 1.8 cm right renal cyst is noted - there is 4 mm focus of a sclerosis in T7 vertebral body, unchanged and likely a bony island  I have reviewed the patient's medical records and diagnostic images at time of this office consultation and have made the following recommendation: 1. CT chest and PET scans reviewed and explained to patient. Results showed slow growing lesions relatively stable. I recommend continuing to monitor with CT Chest no contrast in 6 months. Patient in agreement. I personally spoke to Dr Aviles (Onc), will defer to oncology for further plan in regards to immunotherapy.  I, BILL Ramos, personally performed the evaluation and management (E/M) services for this established patient who presents today with (a) new problem(s)/exacerbation of (an) existing condition(s).  That E/M includes conducting the examination, assessing all new/exacerbated conditions, and establishing a new plan of care.  Today, my ACP, Racquel Escalante, KEDAR-BC was here to observe my evaluation and management services for this new problem/exacerbated condition to be followed going forward.

## 2025-01-31 NOTE — ASSESSMENT
[FreeTextEntry1] : Mr. JERRY DIAZ, 85 year old male, former heavy smoker, w/ hx of HTN, MI in 1986, CABG (Lt internal mammary artery to Lt anterior descending artery) on 3/6/2015 by Dr. Jonny Alex, who presented to PCP for annual physical exam, sent for CXR due to tobacco use, found lung nodule.  Now 3 yrs 9mo s/p Lt VATS Robotic-assisted, MAURICIO apicoposterior segmentectomy, MLND on 10/7/2020. Path revealed AdenoCA, solid predominant, 1.5cm, G3, +WILLIAM, margins and (0/10) LNs negative, pT1bN0 Stg IA2.  Now >4 yrs s/p Right VATS, Robotic-assisted, wedge resection of RLL nodule, wedge resection of RUL nodule, MLND on 11/30/2020. 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 Path of RUL wedge resection revealed invasive papillary carcinoma, G2, + WILLIAM, 0.9 cm, pT1a(m)N0, Stage IA1. Two primary tumors.  Now 1 yr s/p Right VATS, robotic-assisted. Wedge resection of right middle lobe lung nodule.  Right middle lobectomy.  Mediastinal lymph node dissection on 12/11/23. Path revealed RML invasive papillary AdenoCA, 1.1 cm, G2, + Lvl 11R (1/6), all margins are negative, [id number], Stg IIB **IHC in the tumor (TTF1 positive and PAX8 negative) and lymph node (TTF1 positive and CDX2 negative) support the diagnosis of a primary lung adenocarcinoma with metastasis to the lymph node. ***The current tumor was compared to the right upper lobe tumor removed in 2020 ([id number]). Although both tumors are papillary predominant the histomorphology of both tumors are different and therefore considered separate primary tumors  Patient to f/u with Hem/Onc Dr. Aviles for chemo 8 cycles of chemo in June 2024 completed in May 2024, now on immunotherapy for 1 yr.   RUL increased in size, however, pt is on immunotherapy now, and had 3 surgeries in the past, Will continue observation. RTC in 6 mons with CT Chest w/o contrast  CT Chest on 1/21/25: - post-op changes - redemonstration of left paramediastinal atelectasis and airway associated groundglass opacities in left upper lobe - there are tubular branching and cluster of nodules in bilateral lungs, likely impacted airways (for instance in right lower lobe on series 4, image 619).  - there is an ill-defined 2.2 cm focus of groundglass opacity in right upper lobe, relatively stable - there are additional smaller foci of groundglass opacities in right upper lobe as follow: 7 mm focus of groundglass opacity in right upper lobe on series 4, image 485, with slight increase in size (previously measured 6 mm) 7 mm focus on groundglass opacity in right upper lobe on series 4, image 208, unchanged - A 1.8 cm right renal cyst is noted - there is 4 mm focus of a sclerosis in T7 vertebral body, unchanged and likely a bony island  I have reviewed the patient's medical records and diagnostic images at time of this office consultation and have made the following recommendation: 1. CT chest and PET scans reviewed and explained to patient. Results showed slow growing lesions relatively stable. I recommend continuing to monitor with CT Chest no contrast in 6 months. Patient in agreement. I personally spoke to Dr Aviles (Onc), will defer to oncology for further plan in regards to immunotherapy.  I, BILL Ramos, personally performed the evaluation and management (E/M) services for this established patient who presents today with (a) new problem(s)/exacerbation of (an) existing condition(s).  That E/M includes conducting the examination, assessing all new/exacerbated conditions, and establishing a new plan of care.  Today, my ACP, Racquel Escalante, KEDAR-BC was here to observe my evaluation and management services for this new problem/exacerbated condition to be followed going forward.

## 2025-06-30 NOTE — HISTORY OF PRESENT ILLNESS
[FreeTextEntry1] : Mr. JERRY DIAZ, 85 year old male, former heavy smoker, w/ hx of HTN, MI in 1986, CABG (Lt internal mammary artery to Lt anterior descending artery) on 3/6/2015 by Dr. Jonny Alex, who presented to PCP for annual physical exam, sent for CXR due to tobacco use, found lung nodule.  EGD on 7/27/2020 by Dr. Efrain Clemente showed normal esophagus; no Victor's. Path of esophagus at 39cm and 40cm showed reflux-type esophagitis, negative for Victor's, negative for dysplasia. Path of gastric cardia showed chronic nonspecific gastritis, negative H. Pylori, negative for intestinal metaplasia.  FNA of 1.5cm Lt lung nodule on 8/14/2020 at [facility], path revealed NSCLC w/ glandular differentiation (AdenoCA w/ acinar pattern).  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  Now ~4.5 yrs s/p Lt VATS Robotic-assisted, MAURICIO apicoposterior segmentectomy, MLND on 10/7/2020. Path revealed AdenoCA, solid predominant, 1.5cm, G3, +WILLIAM, margins and (0/10) LNs negative, pT1bN0 Stg IA2.  PFTs on 10/30/2020: %, FEV1 111%, DLCO 75%.  CT chest on 11/16/2020: - a 1.5 cm irregular RLL nodule (3: 112), with maximal measurement of 1.8 cm on coronal imaging - abutting the major fissure is a 6 mm nodule (3: 91) which most likely represents an intrapulmonary LN. - 6 mm RUL solid pulmonary nodule (3: 40) - post-op changes - mild bilateral lower lobe bronchiectasis.  Now ~4.5 yrs s/p Right VATS, Robotic-assisted, wedge resection of RLL nodule, wedge resection of RUL nodule, MLND on 11/30/2020. 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 Path of RUL wedge resection revealed invasive papillary carcinoma, G2, + WILLIAM, 0.9 cm, pT1a(m)N0, Stage IA1. Two primary tumors.  CT Chest on 6/7/21: - new nodular opacities in the LLL measuring up to 9mm (4:101) - new nodules in the MAURICIO near surgical suture margin measuring up to 6mm (4:87) - increasing size 6mm RUL ggo (4:80; previously 4mm) - stable 0.6cm perifissural solid RUL nodule (4:115) - MAURICIO mucoid impaction  PET/CT on 6/17/21: - post-op changes - linear opacity adjacent to the MAURICIO suture material with adjacent nodularity 1.7 cm on PET with SUV=3 - subcentimeter nodules anterior to this linear opacity up to 5 mm - small stable 5 mm in RML - cluster nodules in LLL 5 mm, SUV=3.1 - moderate colonic diverticulosis with no evidence of diverticulitis  **Last seen in office June 2021.  CT Chest on 10/23/23 at [facility]: - bilateral post-op changes - a 12 x 11 mm RML spiculated solid pulmonary nodule (3: 126; previously 6 mm) and appearing subsolid on 6/7/2021, highly suspicious for primary bronchogenic neoplasm. - Vague groundglass opacity posterior RUL (3: 73), is overall unchanged measuring 14 mm. - Additional subcentimeter groundglass nodules are present in the RUL, unchanged. - Few additional punctate 3 mm pulmonary nodules posterior RUL on image 78, series 3 are present new from prior exam. - There is a 3 mm RUL nodule on image 85, series 3.  PFT 11/15/23: FVC 94%; FEV1 84%; DLCO 66%  MRI head w/w/o IV cont on 12/5/23:  - Minimal white matter ischemic changes. - No abnormal intracranial enhancement to indicate metastasis.  PET/CT 12/4/23: - Nonspecific small focus of mild FDG activity in right perihilar region, likely corresponding to a small lymph node, is unchanged (SUV 3.0; image 87; previous SUV 3.5). - S/p wedge resections of the RUL; RLL and MAURICIO. Resolution of FDG activity associated with linear opacity along MAURICIO suture line which is unchanged on CT (image 93). - A minimally FDG avid RML posterior segment nodule measures 1.0 x 0.8 cm, SUV 1.4 (image 100), previously 1.2 x 1.0 cm in CT chest 10/23/2023, and 0.7 x 0.5 cm on prior PET/CT from 1/17/21.This is concerning for a low-grade primary lung neoplasm. - A cluster of FDG avid ggn in the RUL just anterior to the wedge resection suture material are new as compared to prior PET/CT, and have increased in number since CT of the chest 10/23/2023, where an ill-defined ggo was seen. For example in this region, there is an FDG avid nodule measuring 1.6 x 1.2 cm, SUV 2.8 (image 83). Findings likely are secondary to an infectious/inflammatory etiology. Please correlate clinically and follow-up with dedicated CT of chest in 1-2 months to assess for resolution.  - Resolution of mildly FDG-avid cluster of LLL pulmonary nodule seen on prior PET/CT.  Now ~1.5 yrs s/p Right VATS, robotic-assisted. Wedge resection of right middle lobe lung nodule.  Right middle lobectomy.  Mediastinal lymph node dissection on 12/11/23. Path revealed RML invasive papillary AdenoCA, 1.1 cm, G2, + Lvl 11R (1/6), all margins are negative, [id number], Stg IIB **IHC in the tumor (TTF1 positive and PAX8 negative) and lymph node (TTF1 positive and CDX2 negative) support the diagnosis of a primary lung adenocarcinoma with metastasis to the lymph node. ***The current tumor was compared to the right upper lobe tumor removed in 2020 ([id number]). Although both tumors are papillary predominant the histomorphology of both tumors are different and therefore considered separate primary tumors  Patient to f/u with Hem/Onc Dr. Aviles for chemo 8 cycles of chemo in June 2024, completed in May 2024, now on immunotherapy for 1 yr.   S/p drainage for cholelithiasis on 4/5/2024, plan for surgery post chemotherapy.   CT chest on 7/11/24: - post-op changes - New tree-in-bud and other punctate clustered nodules in both lungs.  - Unchanged right upper lobe 5 mm solid nodule (2-72).  - New sub-3 mm solid nodule within an otherwise unchanged vague approximate 2 cm groundglass nodule in the right upper lobe (2-45).  - Increased size of 2 adjacent right upper lobe groundglass nodules up to 1.2 cm (2:29-31), prior up to 1 cm.  - Unchanged groundglass nodule in the posterior segment of the right upper lobe (2-64). - No pleural effusion. No lymphadenopathy. Calcified subcarinal lymph node, likely prior granulomatous disease.  RUL increased in size, however, pt is on immunotherapy now, and had 3 surgeries in the past, Will continue observation. RTC in 6 mons with CT Chest w/o contrast  PET/CT 10/30/2024:  - Nonspecific mild activity, (SUV 3) corresponding to 2 new adjacent left upper lung nodules measuring 6 and 5 mm, images 68 and 69. This is medial to the wedge resection site, new from 7/11/2024 chest CT. Mild appearing adjacent micronodules, not avid.   - On image 78 in the medial left upper lung, adjacent to the medial margin of the wedge resection is a small moderately avid focus, SUV 4, poorly delineated on CT.   - Right middle lobectomy. Multiple wedge resections in both lungs.  - Nonavid stable 5 mm right upper lobe solid nodule, image 91;  - Interval decreased size of 9 mm previously 1.2 cm nonavid groundglass opacity, image 66; resolution of the previously noted 3 mm nodule associated with right upper lung ground glass opacity, area is non-avid;  - Motion degradation in the area of the groundglass opacity posterior segment right upper lobe, on image 87, non-FDG avid.  - Nonavid 7 cm exophytic simple left renal cyst.  - Small hiatal hernia. Tiny fat-containing umbilical hernia. Small fat-containing right inguinal hernia.   CT Chest on 1/21/25: - post-op changes - redemonstration of left paramediastinal atelectasis and airway associated groundglass opacities in left upper lobe - there are tubular branching and cluster of nodules in bilateral lungs, likely impacted airways (for instance in right lower lobe on series 4, image 619).  - there is an ill-defined 2.2 cm focus of groundglass opacity in right upper lobe, relatively stable - there are additional smaller foci of groundglass opacities in right upper lobe as follow: 7 mm focus of groundglass opacity in right upper lobe on series 4, image 485, with slight increase in size (previously measured 6 mm) 7 mm focus on groundglass opacity in right upper lobe on series 4, image 208, unchanged - A 1.8 cm right renal cyst is noted - there is 4 mm focus of a sclerosis in T7 vertebral body, unchanged and likely a bony island  CT chest on ___:  -   Pt presents today for follow up.

## 2025-06-30 NOTE — CONSULT LETTER
[Dear  ___] : Dear  [unfilled], [Consult Letter:] : I had the pleasure of evaluating your patient, [unfilled]. [( Thank you for referring [unfilled] for consultation for _____ )] : Thank you for referring [unfilled] for consultation for [unfilled] [Please see my note below.] : Please see my note below. [Consult Closing:] : Thank you very much for allowing me to participate in the care of this patient.  If you have any questions, please do not hesitate to contact me. [Sincerely,] : Sincerely, [DrJaya  ___] : Dr. CHAPARRO [DrJaya ___] : Dr. CHAPARRO [FreeTextEntry2] : Garry Davis MD (Cardiologist/Referring) Valeriy Garcia MD (Pulm) Gisel Hall MD (PCP) Efrain Clemente MD (GI)   [FreeTextEntry3] : Miky Ogden MD, MPH \par  System Director of Thoracic Surgery \par  Director of Comprehensive Lung and Foregut [place] \par  Professor Cardiovascular & Thoracic Surgery  \par  [employer] at [facility]\par

## 2025-06-30 NOTE — ASSESSMENT
[FreeTextEntry1] : Mr. JERRY DIAZ, 85 year old male, former heavy smoker, w/ hx of HTN, MI in 1986, CABG (Lt internal mammary artery to Lt anterior descending artery) on 3/6/2015 by Dr. Jonny Alex, who presented to PCP for annual physical exam, sent for CXR due to tobacco use, found lung nodule.  EGD on 7/27/2020 by Dr. Efrain Clemente showed normal esophagus; no Victor's. Path of esophagus at 39cm and 40cm showed reflux-type esophagitis, negative for Victor's, negative for dysplasia. Path of gastric cardia showed chronic nonspecific gastritis, negative H. Pylori, negative for intestinal metaplasia.  FNA of 1.5cm Lt lung nodule on 8/14/2020 at [facility], path revealed NSCLC w/ glandular differentiation (AdenoCA w/ acinar pattern).  Now ~4.5 yrs s/p Lt VATS Robotic-assisted, MAURICIO apicoposterior segmentectomy, MLND on 10/7/2020. Path revealed AdenoCA, solid predominant, 1.5cm, G3, +WILLIAM, margins and (0/10) LNs negative, pT1bN0 Stg IA2.  Now ~4.5 yrs s/p Right VATS, Robotic-assisted, wedge resection of RLL nodule, wedge resection of RUL nodule, MLND on 11/30/2020. 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 Path of RUL wedge resection revealed invasive papillary carcinoma, G2, + WILLIAM, 0.9 cm, pT1a(m)N0, Stage IA1. Two primary tumors.  **Last seen in office June 2021.  Now ~1.5 yrs s/p Right VATS, robotic-assisted. Wedge resection of right middle lobe lung nodule.  Right middle lobectomy.  Mediastinal lymph node dissection on 12/11/23. Path revealed RML invasive papillary AdenoCA, 1.1 cm, G2, + Lvl 11R (1/6), all margins are negative, [id number], Stg IIB **IHC in the tumor (TTF1 positive and PAX8 negative) and lymph node (TTF1 positive and CDX2 negative) support the diagnosis of a primary lung adenocarcinoma with metastasis to the lymph node. ***The current tumor was compared to the right upper lobe tumor removed in 2020 ([id number]). Although both tumors are papillary predominant the histomorphology of both tumors are different and therefore considered separate primary tumors  Patient to f/u with Hem/Onc Dr. Aviles for chemo 8 cycles of chemo in June 2024, completed in May 2024, now on immunotherapy for 1 yr.   S/p drainage for cholelithiasis on 4/5/2024, plan for surgery post chemotherapy.   RUL increased in size, however, pt is on immunotherapy now, and had 3 surgeries in the past, Will continue observation. RTC in 6 mons with CT Chest w/o contrast  CT chest on ___:  -   I have reviewed the patient's medical records and diagnostic images at time of this office consultation and have made the following recommendation: 1.

## 2025-07-16 NOTE — CONSULT LETTER
[FreeTextEntry2] : Garry Davis MD (Cardiologist/Referring) Valeriy Garcia MD (Pulm) Gisel Hall MD (PCP) Efrain Clemente MD (GI)   [FreeTextEntry3] : Miky Ogden MD, MPH \par  System Director of Thoracic Surgery \par  Director of Comprehensive Lung and Foregut [place] \par  Professor Cardiovascular & Thoracic Surgery  \par  [facility] [employer] at [facility]\par

## 2025-07-16 NOTE — HISTORY OF PRESENT ILLNESS
[FreeTextEntry1] : Mr. JERRY DIAZ, 85 year old male, former heavy smoker, w/ hx of HTN, MI in 1986, CABG (Lt internal mammary artery to Lt anterior descending artery) on 3/6/2015 by Dr. Jonny Alex, who presented to PCP for annual physical exam, sent for CXR due to tobacco use, found lung nodule.  EGD on 7/27/2020 by Dr. Efrain Clemente showed normal esophagus; no Victor's. Path of esophagus at 39cm and 40cm showed reflux-type esophagitis, negative for Victor's, negative for dysplasia. Path of gastric cardia showed chronic nonspecific gastritis, negative H. Pylori, negative for intestinal metaplasia.  FNA of 1.5cm Lt lung nodule on 8/14/2020 at [facility], path revealed NSCLC w/ glandular differentiation (AdenoCA w/ acinar pattern).  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  Now ~4.5 yrs s/p Lt VATS Robotic-assisted, MAURICIO apicoposterior segmentectomy, MLND on 10/7/2020. Path revealed AdenoCA, solid predominant, 1.5cm, G3, +WILLIAM, margins and (0/10) LNs negative, pT1bN0 Stg IA2.  PFTs on 10/30/2020: %, FEV1 111%, DLCO 75%.  CT chest on 11/16/2020: - a 1.5 cm irregular RLL nodule (3: 112), with maximal measurement of 1.8 cm on coronal imaging - abutting the major fissure is a 6 mm nodule (3: 91) which most likely represents an intrapulmonary LN. - 6 mm RUL solid pulmonary nodule (3: 40) - post-op changes - mild bilateral lower lobe bronchiectasis.  Now ~4.5 yrs s/p Right VATS, Robotic-assisted, wedge resection of RLL nodule, wedge resection of RUL nodule, MLND on 11/30/2020. 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 Path of RUL wedge resection revealed invasive papillary carcinoma, G2, + WILLIAM, 0.9 cm, pT1a(m)N0, Stage IA1. Two primary tumors.  CT Chest on 6/7/21: - new nodular opacities in the LLL measuring up to 9mm (4:101) - new nodules in the MAURICIO near surgical suture margin measuring up to 6mm (4:87) - increasing size 6mm RUL ggo (4:80; previously 4mm) - stable 0.6cm perifissural solid RUL nodule (4:115) - MAURICIO mucoid impaction  PET/CT on 6/17/21: - post-op changes - linear opacity adjacent to the MAURICIO suture material with adjacent nodularity 1.7 cm on PET with SUV=3 - subcentimeter nodules anterior to this linear opacity up to 5 mm - small stable 5 mm in RML - cluster nodules in LLL 5 mm, SUV=3.1 - moderate colonic diverticulosis with no evidence of diverticulitis  **Last seen in office June 2021.  CT Chest on 10/23/23 at [facility]: - bilateral post-op changes - a 12 x 11 mm RML spiculated solid pulmonary nodule (3: 126; previously 6 mm) and appearing subsolid on 6/7/2021, highly suspicious for primary bronchogenic neoplasm. - Vague groundglass opacity posterior RUL (3: 73), is overall unchanged measuring 14 mm. - Additional subcentimeter groundglass nodules are present in the RUL, unchanged. - Few additional punctate 3 mm pulmonary nodules posterior RUL on image 78, series 3 are present new from prior exam. - There is a 3 mm RUL nodule on image 85, series 3.  PFT 11/15/23: FVC 94%; FEV1 84%; DLCO 66%  MRI head w/w/o IV cont on 12/5/23:  - Minimal white matter ischemic changes. - No abnormal intracranial enhancement to indicate metastasis.  PET/CT 12/4/23: - Nonspecific small focus of mild FDG activity in right perihilar region, likely corresponding to a small lymph node, is unchanged (SUV 3.0; image 87; previous SUV 3.5). - S/p wedge resections of the RUL; RLL and MAURICIO. Resolution of FDG activity associated with linear opacity along MAURICIO suture line which is unchanged on CT (image 93). - A minimally FDG avid RML posterior segment nodule measures 1.0 x 0.8 cm, SUV 1.4 (image 100), previously 1.2 x 1.0 cm in CT chest 10/23/2023, and 0.7 x 0.5 cm on prior PET/CT from 1/17/21.This is concerning for a low-grade primary lung neoplasm. - A cluster of FDG avid ggn in the RUL just anterior to the wedge resection suture material are new as compared to prior PET/CT, and have increased in number since CT of the chest 10/23/2023, where an ill-defined ggo was seen. For example in this region, there is an FDG avid nodule measuring 1.6 x 1.2 cm, SUV 2.8 (image 83). Findings likely are secondary to an infectious/inflammatory etiology. Please correlate clinically and follow-up with dedicated CT of chest in 1-2 months to assess for resolution.  - Resolution of mildly FDG-avid cluster of LLL pulmonary nodule seen on prior PET/CT.  Now ~1.5 yrs s/p Right VATS, robotic-assisted. Wedge resection of right middle lobe lung nodule.  Right middle lobectomy.  Mediastinal lymph node dissection on 12/11/23. Path revealed RML invasive papillary AdenoCA, 1.1 cm, G2, + Lvl 11R (1/6), all margins are negative, [id number], Stg IIB **IHC in the tumor (TTF1 positive and PAX8 negative) and lymph node (TTF1 positive and CDX2 negative) support the diagnosis of a primary lung adenocarcinoma with metastasis to the lymph node. ***The current tumor was compared to the right upper lobe tumor removed in 2020 ([id number]). Although both tumors are papillary predominant the histomorphology of both tumors are different and therefore considered separate primary tumors  Patient to f/u with Hem/Onc Dr. Aviles for chemo 8 cycles of chemo in June 2024, completed in May 2024, now on immunotherapy for 1 yr.   S/p drainage for cholelithiasis on 4/5/2024, plan for surgery post chemotherapy.   CT chest on 7/11/24: - post-op changes - New tree-in-bud and other punctate clustered nodules in both lungs.  - Unchanged right upper lobe 5 mm solid nodule (2-72).  - New sub-3 mm solid nodule within an otherwise unchanged vague approximate 2 cm groundglass nodule in the right upper lobe (2-45).  - Increased size of 2 adjacent right upper lobe groundglass nodules up to 1.2 cm (2:29-31), prior up to 1 cm.  - Unchanged groundglass nodule in the posterior segment of the right upper lobe (2-64). - No pleural effusion. No lymphadenopathy. Calcified subcarinal lymph node, likely prior granulomatous disease.  RUL increased in size, however, pt is on immunotherapy now, and had 3 surgeries in the past, Will continue observation. RTC in 6 mons with CT Chest w/o contrast  PET/CT 10/30/2024:  - Nonspecific mild activity, (SUV 3) corresponding to 2 new adjacent left upper lung nodules measuring 6 and 5 mm, images 68 and 69. This is medial to the wedge resection site, new from 7/11/2024 chest CT. Mild appearing adjacent micronodules, not avid.   - On image 78 in the medial left upper lung, adjacent to the medial margin of the wedge resection is a small moderately avid focus, SUV 4, poorly delineated on CT.   - Right middle lobectomy. Multiple wedge resections in both lungs.  - Nonavid stable 5 mm right upper lobe solid nodule, image 91;  - Interval decreased size of 9 mm previously 1.2 cm nonavid groundglass opacity, image 66; resolution of the previously noted 3 mm nodule associated with right upper lung ground glass opacity, area is non-avid;  - Motion degradation in the area of the groundglass opacity posterior segment right upper lobe, on image 87, non-FDG avid.  - Nonavid 7 cm exophytic simple left renal cyst.  - Small hiatal hernia. Tiny fat-containing umbilical hernia. Small fat-containing right inguinal hernia.   CT Chest on 1/21/25: - post-op changes - redemonstration of left paramediastinal atelectasis and airway associated groundglass opacities in left upper lobe - there are tubular branching and cluster of nodules in bilateral lungs, likely impacted airways (for instance in right lower lobe on series 4, image 619).  - there is an ill-defined 2.2 cm focus of groundglass opacity in right upper lobe, relatively stable - there are additional smaller foci of groundglass opacities in right upper lobe as follow: 7 mm focus of groundglass opacity in right upper lobe on series 4, image 485, with slight increase in size (previously measured 6 mm) 7 mm focus on groundglass opacity in right upper lobe on series 4, image 208, unchanged - A 1.8 cm right renal cyst is noted - there is 4 mm focus of a sclerosis in T7 vertebral body, unchanged and likely a bony island  CT chest on ___:  -   Pt presents today for follow up.

## 2025-07-16 NOTE — ASSESSMENT
[FreeTextEntry1] : Mr. JERRY DIAZ, 85 year old male, former heavy smoker, w/ hx of HTN, MI in 1986, CABG (Lt internal mammary artery to Lt anterior descending artery) on 3/6/2015 by Dr. Jonny Aelx, who presented to PCP for annual physical exam, sent for CXR due to tobacco use, found lung nodule.  EGD on 7/27/2020 by Dr. Efrain Clemente showed normal esophagus; no Victor's. Path of esophagus at 39cm and 40cm showed reflux-type esophagitis, negative for Victor's, negative for dysplasia. Path of gastric cardia showed chronic nonspecific gastritis, negative H. Pylori, negative for intestinal metaplasia.  FNA of 1.5cm Lt lung nodule on 8/14/2020 at [facility], path revealed NSCLC w/ glandular differentiation (AdenoCA w/ acinar pattern).  Now ~4.5 yrs s/p Lt VATS Robotic-assisted, MAURICIO apicoposterior segmentectomy, MLND on 10/7/2020. Path revealed AdenoCA, solid predominant, 1.5cm, G3, +WILLIAM, margins and (0/10) LNs negative, pT1bN0 Stg IA2.  Now ~4.5 yrs s/p Right VATS, Robotic-assisted, wedge resection of RLL nodule, wedge resection of RUL nodule, MLND on 11/30/2020. 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 Path of RUL wedge resection revealed invasive papillary carcinoma, G2, + WILLIAM, 0.9 cm, pT1a(m)N0, Stage IA1. Two primary tumors.  **Last seen in office June 2021.  Now ~1.5 yrs s/p Right VATS, robotic-assisted. Wedge resection of right middle lobe lung nodule.  Right middle lobectomy.  Mediastinal lymph node dissection on 12/11/23. Path revealed RML invasive papillary AdenoCA, 1.1 cm, G2, + Lvl 11R (1/6), all margins are negative, [id number], Stg IIB **IHC in the tumor (TTF1 positive and PAX8 negative) and lymph node (TTF1 positive and CDX2 negative) support the diagnosis of a primary lung adenocarcinoma with metastasis to the lymph node. ***The current tumor was compared to the right upper lobe tumor removed in 2020 ([id number]). Although both tumors are papillary predominant the histomorphology of both tumors are different and therefore considered separate primary tumors  Patient to f/u with Hem/Onc Dr. Aviles for chemo 8 cycles of chemo in June 2024, completed in May 2024, now on immunotherapy for 1 yr.   S/p drainage for cholelithiasis on 4/5/2024, plan for surgery post chemotherapy.   RUL increased in size, however, pt is on immunotherapy now, and had 3 surgeries in the past, Will continue observation. RTC in 6 mons with CT Chest w/o contrast  CT chest on ___:  -   I have reviewed the patient's medical records and diagnostic images at time of this office consultation and have made the following recommendation: 1.

## 2025-07-20 NOTE — HISTORY OF PRESENT ILLNESS
[FreeTextEntry1] : Mr. JERRY DIAZ, 85 year old male, former heavy smoker, w/ hx of HTN, MI in 1986, CABG (Lt internal mammary artery to Lt anterior descending artery) on 3/6/2015 by Dr. Jonny Alex, who presented to PCP for annual physical exam, sent for CXR due to tobacco use, found lung nodule.  EGD on 7/27/2020 by Dr. Efrain Clemente showed normal esophagus; no Victor's. Path of esophagus at 39cm and 40cm showed reflux-type esophagitis, negative for Victor's, negative for dysplasia. Path of gastric cardia showed chronic nonspecific gastritis, negative H. Pylori, negative for intestinal metaplasia.  FNA of 1.5cm Lt lung nodule on 8/14/2020 at [facility], path revealed NSCLC w/ glandular differentiation (AdenoCA w/ acinar pattern).  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  Now ~4.5 yrs s/p Lt VATS Robotic-assisted, MAURICIO apicoposterior segmentectomy, MLND on 10/7/2020. Path revealed AdenoCA, solid predominant, 1.5cm, G3, +WILLIAM, margins and (0/10) LNs negative, pT1bN0 Stg IA2.  PFTs on 10/30/2020: %, FEV1 111%, DLCO 75%.  CT chest on 11/16/2020: - a 1.5 cm irregular RLL nodule (3: 112), with maximal measurement of 1.8 cm on coronal imaging - abutting the major fissure is a 6 mm nodule (3: 91) which most likely represents an intrapulmonary LN. - 6 mm RUL solid pulmonary nodule (3: 40) - post-op changes - mild bilateral lower lobe bronchiectasis.  Now ~4.5 yrs s/p Right VATS, Robotic-assisted, wedge resection of RLL nodule, wedge resection of RUL nodule, MLND on 11/30/2020. 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 Path of RUL wedge resection revealed invasive papillary carcinoma, G2, + WILLIAM, 0.9 cm, pT1a(m)N0, Stage IA1. Two primary tumors.  CT Chest on 6/7/21: - new nodular opacities in the LLL measuring up to 9mm (4:101) - new nodules in the MAURICIO near surgical suture margin measuring up to 6mm (4:87) - increasing size 6mm RUL ggo (4:80; previously 4mm) - stable 0.6cm perifissural solid RUL nodule (4:115) - MAURICIO mucoid impaction  PET/CT on 6/17/21: - post-op changes - linear opacity adjacent to the MAURICIO suture material with adjacent nodularity 1.7 cm on PET with SUV=3 - subcentimeter nodules anterior to this linear opacity up to 5 mm - small stable 5 mm in RML - cluster nodules in LLL 5 mm, SUV=3.1 - moderate colonic diverticulosis with no evidence of diverticulitis  **Last seen in office June 2021.  CT Chest on 10/23/23 at [facility]: - bilateral post-op changes - a 12 x 11 mm RML spiculated solid pulmonary nodule (3: 126; previously 6 mm) and appearing subsolid on 6/7/2021, highly suspicious for primary bronchogenic neoplasm. - Vague groundglass opacity posterior RUL (3: 73), is overall unchanged measuring 14 mm. - Additional subcentimeter groundglass nodules are present in the RUL, unchanged. - Few additional punctate 3 mm pulmonary nodules posterior RUL on image 78, series 3 are present new from prior exam. - There is a 3 mm RUL nodule on image 85, series 3.  PFT 11/15/23: FVC 94%; FEV1 84%; DLCO 66%  MRI head w/w/o IV cont on 12/5/23:  - Minimal white matter ischemic changes. - No abnormal intracranial enhancement to indicate metastasis.  PET/CT 12/4/23: - Nonspecific small focus of mild FDG activity in right perihilar region, likely corresponding to a small lymph node, is unchanged (SUV 3.0; image 87; previous SUV 3.5). - S/p wedge resections of the RUL; RLL and MAURICIO. Resolution of FDG activity associated with linear opacity along MAURICIO suture line which is unchanged on CT (image 93). - A minimally FDG avid RML posterior segment nodule measures 1.0 x 0.8 cm, SUV 1.4 (image 100), previously 1.2 x 1.0 cm in CT chest 10/23/2023, and 0.7 x 0.5 cm on prior PET/CT from 1/17/21.This is concerning for a low-grade primary lung neoplasm. - A cluster of FDG avid ggn in the RUL just anterior to the wedge resection suture material are new as compared to prior PET/CT, and have increased in number since CT of the chest 10/23/2023, where an ill-defined ggo was seen. For example in this region, there is an FDG avid nodule measuring 1.6 x 1.2 cm, SUV 2.8 (image 83). Findings likely are secondary to an infectious/inflammatory etiology. Please correlate clinically and follow-up with dedicated CT of chest in 1-2 months to assess for resolution.  - Resolution of mildly FDG-avid cluster of LLL pulmonary nodule seen on prior PET/CT.  Now ~1.5 yrs s/p Right VATS, robotic-assisted. Wedge resection of right middle lobe lung nodule.  Right middle lobectomy.  Mediastinal lymph node dissection on 12/11/23. Path revealed RML invasive papillary AdenoCA, 1.1 cm, G2, + Lvl 11R (1/6), all margins are negative, [id number], Stg IIB **IHC in the tumor (TTF1 positive and PAX8 negative) and lymph node (TTF1 positive and CDX2 negative) support the diagnosis of a primary lung adenocarcinoma with metastasis to the lymph node. ***The current tumor was compared to the right upper lobe tumor removed in 2020 ([id number]). Although both tumors are papillary predominant the histomorphology of both tumors are different and therefore considered separate primary tumors  Patient to f/u with Hem/Onc Dr. Aviles for chemo 8 cycles of chemo in June 2024, completed in May 2024, now on immunotherapy for 1 yr.   S/p drainage for cholelithiasis on 4/5/2024, plan for surgery post chemotherapy.   CT chest on 7/11/24: - post-op changes - New tree-in-bud and other punctate clustered nodules in both lungs.  - Unchanged right upper lobe 5 mm solid nodule (2-72).  - New sub-3 mm solid nodule within an otherwise unchanged vague approximate 2 cm groundglass nodule in the right upper lobe (2-45).  - Increased size of 2 adjacent right upper lobe groundglass nodules up to 1.2 cm (2:29-31), prior up to 1 cm.  - Unchanged groundglass nodule in the posterior segment of the right upper lobe (2-64). - No pleural effusion. No lymphadenopathy. Calcified subcarinal lymph node, likely prior granulomatous disease.  RUL increased in size, however, pt is on immunotherapy now, and had 3 surgeries in the past, Will continue observation. RTC in 6 mons with CT Chest w/o contrast  PET/CT 10/30/2024:  - Nonspecific mild activity, (SUV 3) corresponding to 2 new adjacent left upper lung nodules measuring 6 and 5 mm, images 68 and 69. This is medial to the wedge resection site, new from 7/11/2024 chest CT. Mild appearing adjacent micronodules, not avid.   - On image 78 in the medial left upper lung, adjacent to the medial margin of the wedge resection is a small moderately avid focus, SUV 4, poorly delineated on CT.   - Right middle lobectomy. Multiple wedge resections in both lungs.  - Nonavid stable 5 mm right upper lobe solid nodule, image 91;  - Interval decreased size of 9 mm previously 1.2 cm nonavid groundglass opacity, image 66; resolution of the previously noted 3 mm nodule associated with right upper lung ground glass opacity, area is non-avid;  - Motion degradation in the area of the groundglass opacity posterior segment right upper lobe, on image 87, non-FDG avid.  - Nonavid 7 cm exophytic simple left renal cyst.  - Small hiatal hernia. Tiny fat-containing umbilical hernia. Small fat-containing right inguinal hernia.   CT Chest on 1/21/25: - post-op changes - redemonstration of left paramediastinal atelectasis and airway associated groundglass opacities in left upper lobe - there are tubular branching and cluster of nodules in bilateral lungs, likely impacted airways (for instance in right lower lobe on series 4, image 619).  - there is an ill-defined 2.2 cm focus of groundglass opacity in right upper lobe, relatively stable - there are additional smaller foci of groundglass opacities in right upper lobe as follow: 7 mm focus of groundglass opacity in right upper lobe on series 4, image 485, with slight increase in size (previously measured 6 mm) 7 mm focus on groundglass opacity in right upper lobe on series 4, image 208, unchanged - A 1.8 cm right renal cyst is noted - there is 4 mm focus of a sclerosis in T7 vertebral body, unchanged and likely a bony island  CT chest on 07/17/2025:  -   Pt presents today for follow up.

## 2025-07-20 NOTE — CONSULT LETTER
[FreeTextEntry2] : Garry Davis MD (Cardiologist/Referring) Valeriy Garcia MD (Pulm) Gisel Hall MD (PCP) Efrain Clemente MD (GI)   [FreeTextEntry3] : Miky Ogden MD, MPH \par  System Director of Thoracic Surgery \par  Director of Comprehensive Lung and Foregut [place] \par  Professor Cardiovascular & Thoracic Surgery  \par  [employer] [facility] [employer] at [facility]\par

## 2025-07-20 NOTE — ASSESSMENT
[FreeTextEntry1] : Mr. JERRY DIAZ, 85 year old male, former heavy smoker, w/ hx of HTN, MI in 1986, CABG (Lt internal mammary artery to Lt anterior descending artery) on 3/6/2015 by Dr. Jonny Alex, who presented to PCP for annual physical exam, sent for CXR due to tobacco use, found lung nodule.  EGD on 7/27/2020 by Dr. Efrain Clemente showed normal esophagus; no Victor's. Path of esophagus at 39cm and 40cm showed reflux-type esophagitis, negative for Victor's, negative for dysplasia. Path of gastric cardia showed chronic nonspecific gastritis, negative H. Pylori, negative for intestinal metaplasia.  FNA of 1.5cm Lt lung nodule on 8/14/2020 at [facility], path revealed NSCLC w/ glandular differentiation (AdenoCA w/ acinar pattern).  Now ~4.5 yrs s/p Lt VATS Robotic-assisted, MAURICIO apicoposterior segmentectomy, MLND on 10/7/2020. Path revealed AdenoCA, solid predominant, 1.5cm, G3, +WILLIAM, margins and (0/10) LNs negative, pT1bN0 Stg IA2.  Now ~4.5 yrs s/p Right VATS, Robotic-assisted, wedge resection of RLL nodule, wedge resection of RUL nodule, MLND on 11/30/2020. 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 Path of RUL wedge resection revealed invasive papillary carcinoma, G2, + WILLIAM, 0.9 cm, pT1a(m)N0, Stage IA1. Two primary tumors.  Now ~1.5 yrs s/p Right VATS, robotic-assisted. Wedge resection of right middle lobe lung nodule.  Right middle lobectomy.  Mediastinal lymph node dissection on 12/11/23. Path revealed RML invasive papillary AdenoCA, 1.1 cm, G2, + Lvl 11R (1/6), all margins are negative, [id number], Stg IIB **IHC in the tumor (TTF1 positive and PAX8 negative) and lymph node (TTF1 positive and CDX2 negative) support the diagnosis of a primary lung adenocarcinoma with metastasis to the lymph node. ***The current tumor was compared to the right upper lobe tumor removed in 2020 ([id number]). Although both tumors are papillary predominant the histomorphology of both tumors are different and therefore considered separate primary tumors  Patient to f/u with Hem/Onc Dr. Aviles for chemo 8 cycles of chemo in June 2024, completed in May 2024, now on immunotherapy for 1 yr.   S/p drainage for cholelithiasis on 4/5/2024, plan for surgery post chemotherapy.   On 07/18/2024, RUL increased in size, however, pt is on immunotherapy now, and had 3 surgeries in the past, Will continue observation. RTC in 6 mons with CT Chest w/o contrast  CT chest on 07/17/2025:  -   I have reviewed the patient's medical records and diagnostic images at time of this office consultation and have made the following recommendation: 1.

## 2025-07-27 NOTE — ASSESSMENT
[FreeTextEntry1] : Mr. JERRY DIAZ, 85 year old male, former heavy smoker, w/ hx of HTN, MI in 1986, CABG (Lt internal mammary artery to Lt anterior descending artery) on 3/6/2015 by Dr. Jonny Alex, who presented to PCP for annual physical exam, sent for CXR due to tobacco use, found lung nodule.  EGD on 7/27/2020 by Dr. Efrain Clemente showed normal esophagus; no Victor's. Path of esophagus at 39cm and 40cm showed reflux-type esophagitis, negative for Victor's, negative for dysplasia. Path of gastric cardia showed chronic nonspecific gastritis, negative H. Pylori, negative for intestinal metaplasia.  FNA of 1.5cm Lt lung nodule on 8/14/2020 at [facility], path revealed NSCLC w/ glandular differentiation (AdenoCA w/ acinar pattern).  Now ~4.5 yrs s/p Lt VATS Robotic-assisted, MAURICIO apicoposterior segmentectomy, MLND on 10/7/2020. Path revealed AdenoCA, solid predominant, 1.5cm, G3, +WILLIAM, margins and (0/10) LNs negative, pT1bN0 Stg IA2.  Now ~4.5 yrs s/p Right VATS, Robotic-assisted, wedge resection of RLL nodule, wedge resection of RUL nodule, MLND on 11/30/2020. 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 Path of RUL wedge resection revealed invasive papillary carcinoma, G2, + WILLIAM, 0.9 cm, pT1a(m)N0, Stage IA1. Two primary tumors.  Now ~1.5 yrs s/p Right VATS, robotic-assisted. Wedge resection of right middle lobe lung nodule.  Right middle lobectomy.  Mediastinal lymph node dissection on 12/11/23. Path revealed RML invasive papillary AdenoCA, 1.1 cm, G2, + Lvl 11R (1/6), all margins are negative, [id number], Stg IIB **IHC in the tumor (TTF1 positive and PAX8 negative) and lymph node (TTF1 positive and CDX2 negative) support the diagnosis of a primary lung adenocarcinoma with metastasis to the lymph node. ***The current tumor was compared to the right upper lobe tumor removed in 2020 ([id number]). Although both tumors are papillary predominant the histomorphology of both tumors are different and therefore considered separate primary tumors  Patient to f/u with Hem/Onc Dr. Aviles for chemo 8 cycles of chemo in June 2024, completed in May 2024, now on immunotherapy for 1 yr.   S/p drainage for cholelithiasis on 4/5/2024, plan for surgery post chemotherapy.   On 07/18/2024, RUL increased in size, however, pt is on immunotherapy now, and had 3 surgeries in the past, Will continue observation. RTC in 6 mons with CT Chest w/o contrast  CT chest on 07/17/2025:  - Once again, few groundglass/ill-defined nodules are noted within the right lung. They're unchanged in their size and appearance when compared to previous exam.  - Few 0.2 cm solid nodules in both lungs are also unchanged when compared to previous exam. Mild bronchiectasis is noted in both lower lobes.   I have reviewed the patient's medical records and diagnostic images at time of this office consultation and have made the following recommendation: 1.

## 2025-07-27 NOTE — CONSULT LETTER
[FreeTextEntry2] : Garry Davis MD (Cardiologist/Referring) Valeriy Garcia MD (Pulm) Gisel Hall MD (PCP) Efrain Clemente MD (GI)   [FreeTextEntry3] : Miky Ogden MD, MPH \par  System Director of Thoracic Surgery \par  Director of Comprehensive Lung and Foregut [place] \par  Professor Cardiovascular & Thoracic Surgery  \par  [facility] [employer] at [facility]\par

## 2025-07-27 NOTE — HISTORY OF PRESENT ILLNESS
[FreeTextEntry1] : Mr. JERRY DIAZ, 85 year old male, former heavy smoker, w/ hx of HTN, MI in 1986, CABG (Lt internal mammary artery to Lt anterior descending artery) on 3/6/2015 by Dr. Jonny Alex, who presented to PCP for annual physical exam, sent for CXR due to tobacco use, found lung nodule.  EGD on 7/27/2020 by Dr. Efrain Clemente showed normal esophagus; no Victor's. Path of esophagus at 39cm and 40cm showed reflux-type esophagitis, negative for Victor's, negative for dysplasia. Path of gastric cardia showed chronic nonspecific gastritis, negative H. Pylori, negative for intestinal metaplasia.  FNA of 1.5cm Lt lung nodule on 8/14/2020 at [facility], path revealed NSCLC w/ glandular differentiation (AdenoCA w/ acinar pattern).  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  Now ~4.5 yrs s/p Lt VATS Robotic-assisted, MAURICIO apicoposterior segmentectomy, MLND on 10/7/2020. Path revealed AdenoCA, solid predominant, 1.5cm, G3, +WILLIAM, margins and (0/10) LNs negative, pT1bN0 Stg IA2.  PFTs on 10/30/2020: %, FEV1 111%, DLCO 75%.  CT chest on 11/16/2020: - a 1.5 cm irregular RLL nodule (3: 112), with maximal measurement of 1.8 cm on coronal imaging - abutting the major fissure is a 6 mm nodule (3: 91) which most likely represents an intrapulmonary LN. - 6 mm RUL solid pulmonary nodule (3: 40) - post-op changes - mild bilateral lower lobe bronchiectasis.  Now ~4.5 yrs s/p Right VATS, Robotic-assisted, wedge resection of RLL nodule, wedge resection of RUL nodule, MLND on 11/30/2020. 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 Path of RUL wedge resection revealed invasive papillary carcinoma, G2, + WILLIAM, 0.9 cm, pT1a(m)N0, Stage IA1. Two primary tumors.  CT Chest on 6/7/21: - new nodular opacities in the LLL measuring up to 9mm (4:101) - new nodules in the MAURICIO near surgical suture margin measuring up to 6mm (4:87) - increasing size 6mm RUL ggo (4:80; previously 4mm) - stable 0.6cm perifissural solid RUL nodule (4:115) - MAURICIO mucoid impaction  PET/CT on 6/17/21: - post-op changes - linear opacity adjacent to the MAURICIO suture material with adjacent nodularity 1.7 cm on PET with SUV=3 - subcentimeter nodules anterior to this linear opacity up to 5 mm - small stable 5 mm in RML - cluster nodules in LLL 5 mm, SUV=3.1 - moderate colonic diverticulosis with no evidence of diverticulitis  **Last seen in office June 2021.  CT Chest on 10/23/23 at [facility]: - bilateral post-op changes - a 12 x 11 mm RML spiculated solid pulmonary nodule (3: 126; previously 6 mm) and appearing subsolid on 6/7/2021, highly suspicious for primary bronchogenic neoplasm. - Vague groundglass opacity posterior RUL (3: 73), is overall unchanged measuring 14 mm. - Additional subcentimeter groundglass nodules are present in the RUL, unchanged. - Few additional punctate 3 mm pulmonary nodules posterior RUL on image 78, series 3 are present new from prior exam. - There is a 3 mm RUL nodule on image 85, series 3.  PFT 11/15/23: FVC 94%; FEV1 84%; DLCO 66%  MRI head w/w/o IV cont on 12/5/23:  - Minimal white matter ischemic changes. - No abnormal intracranial enhancement to indicate metastasis.  PET/CT 12/4/23: - Nonspecific small focus of mild FDG activity in right perihilar region, likely corresponding to a small lymph node, is unchanged (SUV 3.0; image 87; previous SUV 3.5). - S/p wedge resections of the RUL; RLL and MAURICIO. Resolution of FDG activity associated with linear opacity along MAURICIO suture line which is unchanged on CT (image 93). - A minimally FDG avid RML posterior segment nodule measures 1.0 x 0.8 cm, SUV 1.4 (image 100), previously 1.2 x 1.0 cm in CT chest 10/23/2023, and 0.7 x 0.5 cm on prior PET/CT from 1/17/21.This is concerning for a low-grade primary lung neoplasm. - A cluster of FDG avid ggn in the RUL just anterior to the wedge resection suture material are new as compared to prior PET/CT, and have increased in number since CT of the chest 10/23/2023, where an ill-defined ggo was seen. For example in this region, there is an FDG avid nodule measuring 1.6 x 1.2 cm, SUV 2.8 (image 83). Findings likely are secondary to an infectious/inflammatory etiology. Please correlate clinically and follow-up with dedicated CT of chest in 1-2 months to assess for resolution.  - Resolution of mildly FDG-avid cluster of LLL pulmonary nodule seen on prior PET/CT.  Now ~1.5 yrs s/p Right VATS, robotic-assisted. Wedge resection of right middle lobe lung nodule.  Right middle lobectomy.  Mediastinal lymph node dissection on 12/11/23. Path revealed RML invasive papillary AdenoCA, 1.1 cm, G2, + Lvl 11R (1/6), all margins are negative, [id number], Stg IIB **IHC in the tumor (TTF1 positive and PAX8 negative) and lymph node (TTF1 positive and CDX2 negative) support the diagnosis of a primary lung adenocarcinoma with metastasis to the lymph node. ***The current tumor was compared to the right upper lobe tumor removed in 2020 ([id number]). Although both tumors are papillary predominant the histomorphology of both tumors are different and therefore considered separate primary tumors  Patient to f/u with Hem/Onc Dr. Aviles for chemo 8 cycles of chemo in June 2024, completed in May 2024, now on immunotherapy for 1 yr.   S/p drainage for cholelithiasis on 4/5/2024, plan for surgery post chemotherapy.   CT chest on 7/11/24: - post-op changes - New tree-in-bud and other punctate clustered nodules in both lungs.  - Unchanged right upper lobe 5 mm solid nodule (2-72).  - New sub-3 mm solid nodule within an otherwise unchanged vague approximate 2 cm groundglass nodule in the right upper lobe (2-45).  - Increased size of 2 adjacent right upper lobe groundglass nodules up to 1.2 cm (2:29-31), prior up to 1 cm.  - Unchanged groundglass nodule in the posterior segment of the right upper lobe (2-64). - No pleural effusion. No lymphadenopathy. Calcified subcarinal lymph node, likely prior granulomatous disease.  RUL increased in size, however, pt is on immunotherapy now, and had 3 surgeries in the past, Will continue observation. RTC in 6 mons with CT Chest w/o contrast  PET/CT 10/30/2024:  - Nonspecific mild activity, (SUV 3) corresponding to 2 new adjacent left upper lung nodules measuring 6 and 5 mm, images 68 and 69. This is medial to the wedge resection site, new from 7/11/2024 chest CT. Mild appearing adjacent micronodules, not avid.   - On image 78 in the medial left upper lung, adjacent to the medial margin of the wedge resection is a small moderately avid focus, SUV 4, poorly delineated on CT.   - Right middle lobectomy. Multiple wedge resections in both lungs.  - Nonavid stable 5 mm right upper lobe solid nodule, image 91;  - Interval decreased size of 9 mm previously 1.2 cm nonavid groundglass opacity, image 66; resolution of the previously noted 3 mm nodule associated with right upper lung ground glass opacity, area is non-avid;  - Motion degradation in the area of the groundglass opacity posterior segment right upper lobe, on image 87, non-FDG avid.  - Nonavid 7 cm exophytic simple left renal cyst.  - Small hiatal hernia. Tiny fat-containing umbilical hernia. Small fat-containing right inguinal hernia.   CT Chest on 1/21/25: - post-op changes - redemonstration of left paramediastinal atelectasis and airway associated groundglass opacities in left upper lobe - there are tubular branching and cluster of nodules in bilateral lungs, likely impacted airways (for instance in right lower lobe on series 4, image 619).  - there is an ill-defined 2.2 cm focus of groundglass opacity in right upper lobe, relatively stable - there are additional smaller foci of groundglass opacities in right upper lobe as follow: 7 mm focus of groundglass opacity in right upper lobe on series 4, image 485, with slight increase in size (previously measured 6 mm) 7 mm focus on groundglass opacity in right upper lobe on series 4, image 208, unchanged - A 1.8 cm right renal cyst is noted - there is 4 mm focus of a sclerosis in T7 vertebral body, unchanged and likely a bony island  CT chest on 07/17/2025:  - Once again, few groundglass/ill-defined nodules are noted within the right lung. They're unchanged in their size and appearance when compared to previous exam.  - Few 0.2 cm solid nodules in both lungs are also unchanged when compared to previous exam. Mild bronchiectasis is noted in both lower lobes.   Pt presents today for follow up.